# Patient Record
Sex: FEMALE | Race: OTHER | NOT HISPANIC OR LATINO | URBAN - METROPOLITAN AREA
[De-identification: names, ages, dates, MRNs, and addresses within clinical notes are randomized per-mention and may not be internally consistent; named-entity substitution may affect disease eponyms.]

---

## 2017-07-15 ENCOUNTER — OUTPATIENT (OUTPATIENT)
Dept: OUTPATIENT SERVICES | Facility: HOSPITAL | Age: 62
LOS: 1 days | Discharge: HOME | End: 2017-07-15

## 2017-07-15 DIAGNOSIS — R10.9 UNSPECIFIED ABDOMINAL PAIN: ICD-10-CM

## 2017-08-15 ENCOUNTER — OUTPATIENT (OUTPATIENT)
Dept: OUTPATIENT SERVICES | Facility: HOSPITAL | Age: 62
LOS: 1 days | Discharge: HOME | End: 2017-08-15

## 2017-08-21 DIAGNOSIS — R10.13 EPIGASTRIC PAIN: ICD-10-CM

## 2017-08-21 DIAGNOSIS — I10 ESSENTIAL (PRIMARY) HYPERTENSION: ICD-10-CM

## 2017-08-21 DIAGNOSIS — K29.60 OTHER GASTRITIS WITHOUT BLEEDING: ICD-10-CM

## 2017-10-20 ENCOUNTER — OUTPATIENT (OUTPATIENT)
Dept: OUTPATIENT SERVICES | Facility: HOSPITAL | Age: 62
LOS: 1 days | Discharge: HOME | End: 2017-10-20

## 2017-10-20 DIAGNOSIS — K21.9 GASTRO-ESOPHAGEAL REFLUX DISEASE WITHOUT ESOPHAGITIS: ICD-10-CM

## 2017-10-20 DIAGNOSIS — E78.00 PURE HYPERCHOLESTEROLEMIA, UNSPECIFIED: ICD-10-CM

## 2017-10-20 DIAGNOSIS — I10 ESSENTIAL (PRIMARY) HYPERTENSION: ICD-10-CM

## 2017-10-20 PROBLEM — Z00.00 ENCOUNTER FOR PREVENTIVE HEALTH EXAMINATION: Status: ACTIVE | Noted: 2017-10-20

## 2017-11-20 ENCOUNTER — APPOINTMENT (OUTPATIENT)
Dept: OBGYN | Facility: CLINIC | Age: 62
End: 2017-11-20

## 2017-11-20 ENCOUNTER — OUTPATIENT (OUTPATIENT)
Dept: OUTPATIENT SERVICES | Facility: HOSPITAL | Age: 62
LOS: 1 days | Discharge: HOME | End: 2017-11-20

## 2017-11-20 VITALS
BODY MASS INDEX: 30.67 KG/M2 | HEIGHT: 62 IN | WEIGHT: 166.7 LBS | DIASTOLIC BLOOD PRESSURE: 68 MMHG | SYSTOLIC BLOOD PRESSURE: 110 MMHG

## 2017-11-21 ENCOUNTER — RESULT REVIEW (OUTPATIENT)
Age: 62
End: 2017-11-21

## 2017-11-29 LAB — HUMAN PAPILLOMA VIRUS HR(SIUH): NOT DETECTED

## 2017-11-30 DIAGNOSIS — Z01.419 ENCOUNTER FOR GYNECOLOGICAL EXAMINATION (GENERAL) (ROUTINE) WITHOUT ABNORMAL FINDINGS: ICD-10-CM

## 2018-03-26 ENCOUNTER — TRANSCRIPTION ENCOUNTER (OUTPATIENT)
Age: 63
End: 2018-03-26

## 2018-03-27 ENCOUNTER — OUTPATIENT (OUTPATIENT)
Dept: OUTPATIENT SERVICES | Facility: HOSPITAL | Age: 63
LOS: 1 days | Discharge: HOME | End: 2018-03-27

## 2018-04-04 DIAGNOSIS — M79.89 OTHER SPECIFIED SOFT TISSUE DISORDERS: ICD-10-CM

## 2018-06-05 ENCOUNTER — TRANSCRIPTION ENCOUNTER (OUTPATIENT)
Age: 63
End: 2018-06-05

## 2019-02-14 ENCOUNTER — OUTPATIENT (OUTPATIENT)
Dept: OUTPATIENT SERVICES | Facility: HOSPITAL | Age: 64
LOS: 1 days | Discharge: HOME | End: 2019-02-14

## 2019-02-14 DIAGNOSIS — I10 ESSENTIAL (PRIMARY) HYPERTENSION: ICD-10-CM

## 2019-02-14 DIAGNOSIS — K21.9 GASTRO-ESOPHAGEAL REFLUX DISEASE WITHOUT ESOPHAGITIS: ICD-10-CM

## 2019-02-14 DIAGNOSIS — E78.00 PURE HYPERCHOLESTEROLEMIA, UNSPECIFIED: ICD-10-CM

## 2020-04-26 ENCOUNTER — MESSAGE (OUTPATIENT)
Age: 65
End: 2020-04-26

## 2020-05-21 ENCOUNTER — APPOINTMENT (OUTPATIENT)
Dept: DISASTER EMERGENCY | Facility: CLINIC | Age: 65
End: 2020-05-21

## 2020-05-22 LAB
SARS-COV-2 IGG SERPL IA-ACNC: <0.1 INDEX
SARS-COV-2 IGG SERPL QL IA: NEGATIVE

## 2020-11-03 ENCOUNTER — APPOINTMENT (OUTPATIENT)
Dept: GASTROENTEROLOGY | Facility: CLINIC | Age: 65
End: 2020-11-03
Payer: COMMERCIAL

## 2020-11-03 DIAGNOSIS — Z87.39 PERSONAL HISTORY OF OTHER DISEASES OF THE MUSCULOSKELETAL SYSTEM AND CONNECTIVE TISSUE: ICD-10-CM

## 2020-11-03 DIAGNOSIS — Z12.11 ENCOUNTER FOR SCREENING FOR MALIGNANT NEOPLASM OF COLON: ICD-10-CM

## 2020-11-03 DIAGNOSIS — Z78.9 OTHER SPECIFIED HEALTH STATUS: ICD-10-CM

## 2020-11-03 DIAGNOSIS — K21.9 GASTRO-ESOPHAGEAL REFLUX DISEASE W/OUT ESOPHAGITIS: ICD-10-CM

## 2020-11-03 DIAGNOSIS — Z83.3 FAMILY HISTORY OF DIABETES MELLITUS: ICD-10-CM

## 2020-11-03 DIAGNOSIS — R10.13 EPIGASTRIC PAIN: ICD-10-CM

## 2020-11-03 PROCEDURE — 99204 OFFICE O/P NEW MOD 45 MIN: CPT | Mod: 95

## 2020-11-03 RX ORDER — PANTOPRAZOLE 40 MG/1
40 TABLET, DELAYED RELEASE ORAL DAILY
Qty: 30 | Refills: 6 | Status: ACTIVE | COMMUNITY
Start: 2020-11-03 | End: 1900-01-01

## 2020-11-03 RX ORDER — PANTOPRAZOLE 40 MG/1
40 TABLET, DELAYED RELEASE ORAL
Refills: 0 | Status: ACTIVE | COMMUNITY

## 2020-11-03 RX ORDER — HYDROCHLOROTHIAZIDE 25 MG/1
25 TABLET ORAL
Refills: 0 | Status: ACTIVE | COMMUNITY

## 2020-11-03 RX ORDER — ATENOLOL 25 MG/1
25 TABLET ORAL
Refills: 0 | Status: ACTIVE | COMMUNITY

## 2020-11-03 NOTE — ASSESSMENT
[FreeTextEntry1] : 65 year old female patient average risk for CRC, due for screening colonoscopy in 2020, last EGD done  in 2017 for GERD and NUD was unremarkable, and currently is maintained on pantoprazole 40 daily, presents today for screening colonoscopy.\par Denies any GI complaints. \par \par Screening colonoscopy\par Risks and benefits discussed with patient.\par refilled pantoprazole\par

## 2020-11-03 NOTE — PHYSICAL EXAM
[General Appearance - Alert] : alert [Hearing Threshold Finger Rub Not Dodge] : hearing was normal [] : no respiratory distress [Oriented To Time, Place, And Person] : oriented to person, place, and time

## 2020-11-03 NOTE — HISTORY OF PRESENT ILLNESS
[Home] : at home, [unfilled] , at the time of the visit. [Verbal consent obtained from patient] : the patient, [unfilled] [Medical Office: ([facility])___] : at the medical office located in  [FreeTextEntry4] : Therese Glover [de-identified] : 65 year old female patient average risk for CRC, due for screening colonoscopy in 2020, last EGD done  in 2017 for GERD and NUD was unremarkable, and currently is maintained on pantoprazole 40 daily, presents today for screening colonoscopy.\par Denies any GI complaints.

## 2020-11-30 ENCOUNTER — APPOINTMENT (OUTPATIENT)
Dept: OBGYN | Facility: CLINIC | Age: 65
End: 2020-11-30
Payer: COMMERCIAL

## 2020-11-30 ENCOUNTER — OUTPATIENT (OUTPATIENT)
Dept: OUTPATIENT SERVICES | Facility: HOSPITAL | Age: 65
LOS: 1 days | Discharge: HOME | End: 2020-11-30

## 2020-11-30 ENCOUNTER — TRANSCRIPTION ENCOUNTER (OUTPATIENT)
Age: 65
End: 2020-11-30

## 2020-11-30 VITALS
SYSTOLIC BLOOD PRESSURE: 104 MMHG | WEIGHT: 147 LBS | DIASTOLIC BLOOD PRESSURE: 76 MMHG | HEIGHT: 62 IN | BODY MASS INDEX: 27.05 KG/M2

## 2020-11-30 PROCEDURE — 99396 PREV VISIT EST AGE 40-64: CPT

## 2020-11-30 NOTE — HISTORY OF PRESENT ILLNESS
[FreeTextEntry1] : P [Patient reported mammogram was normal] : Patient reported mammogram was normal [Patient reported PAP Smear was normal] : Patient reported PAP Smear was normal [TextBox_4] : Patient feels well. No complaints. No PMB. Here for annual [Mammogramdate] : 2019 [PapSmeardate] : 2017

## 2020-12-06 LAB — HPV HIGH+LOW RISK DNA PNL CVX: NOT DETECTED

## 2020-12-07 ENCOUNTER — LABORATORY RESULT (OUTPATIENT)
Age: 65
End: 2020-12-07

## 2020-12-08 ENCOUNTER — OUTPATIENT (OUTPATIENT)
Dept: OUTPATIENT SERVICES | Facility: HOSPITAL | Age: 65
LOS: 1 days | Discharge: HOME | End: 2020-12-08

## 2020-12-08 DIAGNOSIS — Z11.59 ENCOUNTER FOR SCREENING FOR OTHER VIRAL DISEASES: ICD-10-CM

## 2020-12-11 ENCOUNTER — RESULT REVIEW (OUTPATIENT)
Age: 65
End: 2020-12-11

## 2020-12-11 ENCOUNTER — OUTPATIENT (OUTPATIENT)
Dept: OUTPATIENT SERVICES | Facility: HOSPITAL | Age: 65
LOS: 1 days | Discharge: HOME | End: 2020-12-11
Payer: COMMERCIAL

## 2020-12-11 ENCOUNTER — TRANSCRIPTION ENCOUNTER (OUTPATIENT)
Age: 65
End: 2020-12-11

## 2020-12-11 VITALS
TEMPERATURE: 97 F | HEIGHT: 62 IN | HEART RATE: 54 BPM | RESPIRATION RATE: 17 BRPM | OXYGEN SATURATION: 99 % | WEIGHT: 147.05 LBS | SYSTOLIC BLOOD PRESSURE: 117 MMHG | DIASTOLIC BLOOD PRESSURE: 56 MMHG

## 2020-12-11 VITALS — SYSTOLIC BLOOD PRESSURE: 111 MMHG | HEART RATE: 62 BPM | RESPIRATION RATE: 15 BRPM | DIASTOLIC BLOOD PRESSURE: 75 MMHG

## 2020-12-11 DIAGNOSIS — Z98.890 OTHER SPECIFIED POSTPROCEDURAL STATES: Chronic | ICD-10-CM

## 2020-12-11 PROCEDURE — 45380 COLONOSCOPY AND BIOPSY: CPT

## 2020-12-11 PROCEDURE — 88305 TISSUE EXAM BY PATHOLOGIST: CPT | Mod: 26

## 2020-12-11 RX ORDER — PANTOPRAZOLE 40 MG/1
40 TABLET, DELAYED RELEASE ORAL DAILY
Qty: 30 | Refills: 6 | Status: ACTIVE | COMMUNITY
Start: 2020-12-11 | End: 1900-01-01

## 2020-12-11 NOTE — ASU DISCHARGE PLAN (ADULT/PEDIATRIC) - CARE PROVIDER_API CALL
Cira Adler (MD)  Gastroenterology  [address] [place] [address]  Phone: [phone]  Fax: [phone]  Follow Up Time:

## 2020-12-11 NOTE — PRE-ANESTHESIA EVALUATION ADULT - TUBE FEEDING HELD SINCE
PRIMARY DIAGNOSIS: PNEUMONIA  OUTPATIENT/OBSERVATION GOALS TO BE MET BEFORE DISCHARGE:  1. Dyspnea improved and O2 sats >88% on RA or back to baseline O2 levels: No   SpO2: 92 %, O2 Device: None (Room air)    Tolerating oral abx or appropriate plans made outpatient infusion: N/a    2. Vitals signs normal or return to baseline: Yes    3. Short term supplemental O2 needed with activity at home: No    4. Tolerate oral intake to maintain hydration: Yes    5. Return to near baseline physical activity: No    Discharge Planner Nurse   Safe discharge environment identified: Yes  Barriers to discharge: No       Entered by: Lionel Amaya 12/31/2019 10:09 AM     Please review provider order for any additional goals.   Nurse to notify provider when observation goals have been met and patient is ready for discharge.   11-Dec-2020 11:34

## 2020-12-11 NOTE — H&P PST ADULT - HISTORY OF PRESENT ILLNESS
64 yo female patient average risk CRC due to screening colonoscopy in 2020 64 yo female patient average risk CRC due to screening colonoscopy in 2020 last EGD 2017 for GERD and NUD was unremarkable currently maintained on pantoprazole 40mg daily plan for Colonoscopy today

## 2020-12-11 NOTE — ASU DISCHARGE PLAN (ADULT/PEDIATRIC) - PATIENT BELONGINGS
Patient's belongings returned Perilesional Excision Additional Text (Leave Blank If You Do Not Want): The margin was drawn around the clinically apparent lesion. Incisions were then made along these lines to the appropriate tissue plane and the lesion was extirpated.

## 2020-12-11 NOTE — ASU DISCHARGE PLAN (ADULT/PEDIATRIC) - CALL YOUR DOCTOR IF YOU HAVE ANY OF THE FOLLOWING:
Excessive diarrhea/Pain not relieved by Medications/Inability to tolerate liquids or foods/Fever greater than (need to indicate Fahrenheit or Celsius)/Nausea and vomiting that does not stop/Bleeding that does not stop

## 2020-12-14 LAB — SURGICAL PATHOLOGY STUDY: SIGNIFICANT CHANGE UP

## 2020-12-16 DIAGNOSIS — Z12.11 ENCOUNTER FOR SCREENING FOR MALIGNANT NEOPLASM OF COLON: ICD-10-CM

## 2020-12-16 DIAGNOSIS — D12.5 BENIGN NEOPLASM OF SIGMOID COLON: ICD-10-CM

## 2020-12-16 DIAGNOSIS — Z86.010 PERSONAL HISTORY OF COLONIC POLYPS: ICD-10-CM

## 2020-12-16 DIAGNOSIS — K21.9 GASTRO-ESOPHAGEAL REFLUX DISEASE WITHOUT ESOPHAGITIS: ICD-10-CM

## 2020-12-16 DIAGNOSIS — K64.8 OTHER HEMORRHOIDS: ICD-10-CM

## 2020-12-16 DIAGNOSIS — I10 ESSENTIAL (PRIMARY) HYPERTENSION: ICD-10-CM

## 2020-12-29 ENCOUNTER — OUTPATIENT (OUTPATIENT)
Dept: OUTPATIENT SERVICES | Facility: HOSPITAL | Age: 65
LOS: 1 days | Discharge: HOME | End: 2020-12-29
Payer: COMMERCIAL

## 2020-12-29 ENCOUNTER — RESULT REVIEW (OUTPATIENT)
Age: 65
End: 2020-12-29

## 2020-12-29 DIAGNOSIS — Z98.890 OTHER SPECIFIED POSTPROCEDURAL STATES: Chronic | ICD-10-CM

## 2020-12-29 DIAGNOSIS — Z12.31 ENCOUNTER FOR SCREENING MAMMOGRAM FOR MALIGNANT NEOPLASM OF BREAST: ICD-10-CM

## 2020-12-29 PROBLEM — I10 ESSENTIAL (PRIMARY) HYPERTENSION: Chronic | Status: ACTIVE | Noted: 2020-12-11

## 2020-12-29 PROCEDURE — 77067 SCR MAMMO BI INCL CAD: CPT | Mod: 26

## 2020-12-29 PROCEDURE — 77063 BREAST TOMOSYNTHESIS BI: CPT | Mod: 26

## 2021-01-14 ENCOUNTER — RESULT REVIEW (OUTPATIENT)
Age: 66
End: 2021-01-14

## 2021-01-14 ENCOUNTER — OUTPATIENT (OUTPATIENT)
Dept: OUTPATIENT SERVICES | Facility: HOSPITAL | Age: 66
LOS: 1 days | Discharge: HOME | End: 2021-01-14
Payer: COMMERCIAL

## 2021-01-14 DIAGNOSIS — Z98.890 OTHER SPECIFIED POSTPROCEDURAL STATES: Chronic | ICD-10-CM

## 2021-01-14 DIAGNOSIS — R92.8 OTHER ABNORMAL AND INCONCLUSIVE FINDINGS ON DIAGNOSTIC IMAGING OF BREAST: ICD-10-CM

## 2021-01-14 PROCEDURE — 76642 ULTRASOUND BREAST LIMITED: CPT | Mod: 26,RT

## 2021-01-14 PROCEDURE — 77065 DX MAMMO INCL CAD UNI: CPT | Mod: 26,RT

## 2021-01-14 PROCEDURE — G0279: CPT | Mod: 26

## 2021-01-15 ENCOUNTER — NON-APPOINTMENT (OUTPATIENT)
Age: 66
End: 2021-01-15

## 2021-01-20 ENCOUNTER — RESULT REVIEW (OUTPATIENT)
Age: 66
End: 2021-01-20

## 2021-01-20 ENCOUNTER — OUTPATIENT (OUTPATIENT)
Dept: OUTPATIENT SERVICES | Facility: HOSPITAL | Age: 66
LOS: 1 days | Discharge: HOME | End: 2021-01-20
Payer: COMMERCIAL

## 2021-01-20 DIAGNOSIS — Z98.890 OTHER SPECIFIED POSTPROCEDURAL STATES: Chronic | ICD-10-CM

## 2021-01-20 PROCEDURE — 19082 BX BREAST ADD LESION STRTCTC: CPT | Mod: RT

## 2021-01-20 PROCEDURE — 88305 TISSUE EXAM BY PATHOLOGIST: CPT | Mod: 26

## 2021-01-20 PROCEDURE — 88360 TUMOR IMMUNOHISTOCHEM/MANUAL: CPT | Mod: 26

## 2021-01-20 PROCEDURE — 19081 BX BREAST 1ST LESION STRTCTC: CPT | Mod: RT

## 2021-01-21 LAB — SURGICAL PATHOLOGY STUDY: SIGNIFICANT CHANGE UP

## 2021-01-22 ENCOUNTER — NON-APPOINTMENT (OUTPATIENT)
Age: 66
End: 2021-01-22

## 2021-01-25 ENCOUNTER — APPOINTMENT (OUTPATIENT)
Dept: SURGERY | Facility: CLINIC | Age: 66
End: 2021-01-25
Payer: COMMERCIAL

## 2021-01-25 PROCEDURE — 99205K: CUSTOM

## 2021-01-26 ENCOUNTER — APPOINTMENT (OUTPATIENT)
Dept: MRI IMAGING | Facility: IMAGING CENTER | Age: 66
End: 2021-01-26
Payer: COMMERCIAL

## 2021-01-26 ENCOUNTER — OUTPATIENT (OUTPATIENT)
Dept: OUTPATIENT SERVICES | Facility: HOSPITAL | Age: 66
LOS: 1 days | End: 2021-01-26
Payer: COMMERCIAL

## 2021-01-26 ENCOUNTER — RESULT REVIEW (OUTPATIENT)
Age: 66
End: 2021-01-26

## 2021-01-26 DIAGNOSIS — D05.11 INTRADUCTAL CARCINOMA IN SITU OF RIGHT BREAST: ICD-10-CM

## 2021-01-26 DIAGNOSIS — N64.1 FAT NECROSIS OF BREAST: ICD-10-CM

## 2021-01-26 DIAGNOSIS — Z00.8 ENCOUNTER FOR OTHER GENERAL EXAMINATION: ICD-10-CM

## 2021-01-26 DIAGNOSIS — N64.89 OTHER SPECIFIED DISORDERS OF BREAST: ICD-10-CM

## 2021-01-26 DIAGNOSIS — Z98.890 OTHER SPECIFIED POSTPROCEDURAL STATES: Chronic | ICD-10-CM

## 2021-01-26 DIAGNOSIS — Z17.0 ESTROGEN RECEPTOR POSITIVE STATUS [ER+]: ICD-10-CM

## 2021-01-26 DIAGNOSIS — R92.0 MAMMOGRAPHIC MICROCALCIFICATION FOUND ON DIAGNOSTIC IMAGING OF BREAST: ICD-10-CM

## 2021-01-26 DIAGNOSIS — N64.2 ATROPHY OF BREAST: ICD-10-CM

## 2021-01-26 PROCEDURE — C8937: CPT

## 2021-01-26 PROCEDURE — 77049 MRI BREAST C-+ W/CAD BI: CPT | Mod: 26

## 2021-01-26 PROCEDURE — C8908: CPT

## 2021-01-26 PROCEDURE — A9585: CPT

## 2021-01-29 ENCOUNTER — RESULT REVIEW (OUTPATIENT)
Age: 66
End: 2021-01-29

## 2021-02-01 NOTE — HISTORY OF PRESENT ILLNESS
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 \par \par Her work up was as follows: \par 12/29/2020 -- b/l screening mammogram \par -scattered areas of fibroglandular densities\par -R: calcifications in UOQ, R; asymmetry in medial R \par -no suspicious mass, microcalcifications or architectural distortion in L \par BIRADS 0 \par \par 1/14/2021 -- R dx mammogram and US \par -scattered areas of fibroglandular densities\par RIGHT: \par -UOQ, R, grouped heterogenous calcifications --> BIOPSY \par -indeterminate medial R asymmetry --> BIOPSY \par R US \par -@4N4, benign cyst, measures 3 x 3 x 2 mm \par BIRADS 4 \par \par 1/29/2021 -- R breast biopsies \par 1. R medial stereotactic guided biopsy \par -fatty breast tissue with fat necrosisi \par \par 2. R lateral calcifications \par -DCIS, intermediate to high nuclear grade \par -ER/DE pos ([facility] 8/5-6)\par \par 1/26/2021 -- MR breast \par RIGHT: \par -in UIQ, small hematoma, measures 2.1 x 1.3 x 3.6 cm with biopsy clip @ aite of biopsy proven benign fat necrosis \par -in UOQ, associate with biopsy proven DCIS, clumped progressive nonmass enhancement, measures 1.6 x 0.6 cm \par LEFT: \par -no suspicious enhancement in L \par BIRADS 6 \par \par

## 2021-02-01 NOTE — DATA REVIEWED
[FreeTextEntry1] : EXAM:  MG MAMMO SCREEN W AMOL BI#\par \par \par PROCEDURE DATE:  12/29/2020\par \par \par \par INTERPRETATION:  HISTORY:\par Bilateral MG MAMMO SCREEN W AMOL BI# was performed. Patient is 65 years old and is seen for screening. The patient has no personal history of cancer.  The patient has no family history of breast cancer.\par \par RISK ASSESSMENT:\par NCI Lifetime Risk: 5.7\par Tyrer-Cuzick Lifetime Risk: 3.8\par \par CLINICAL BREAST EXAM:\par The patient reports her last clinical breast exam was performed 1 month ago.\par \par COMPARISON STUDIES:\par The present examination has been compared to prior imaging studies performed at an outside location on 10/04/2016, 10/03/2017, 10/27/2018 and 10/12/2019.\par \par MAMMOGRAM FINDINGS:\par Mammography was performed including the following views: bilateral craniocaudal with tomosynthesis, bilateral mediolateral oblique, and bilateral mediolateral oblique with tomosynthesis.  The examination includes digital synthetic 2D and digital tomosynthesis 3D images. Additional imaging analysis was performed using CAD (computer-aided detection) software.\par \par There are scattered areas of fibroglandular density.\par \par Finding 1:  There are calcifications seen in the upper outer quadrant of the right breast.\par \par Finding 2:  There is an asymmetry seen in the medial of the right breast.\par \par No suspicious mass, grouping of calcifications, or other abnormality is identified in the left breast.\par \par IMPRESSION:\par Finding 1:  Calcifications in the right breast require additional evaluation.\par \par RECOMMENDATION:\par Patient will be recalled for additional views.\par \par Finding 2:  Asymmetry in the right breast requires additional evaluation.\par \par RECOMMENDATION:\par Patient will be recalled for additional mammographic views and, if indicated, breast ultrasound.\par \par ASSESSMENT:\par BI-RADS Category 0:  Incomplete: Needs Additional Imaging Evaluation\par \par The patient will be notified of these results by telephone, and will also be mailed a written summary in layman's terms.\par \par \par \par \par \par \par ALEXANDRA WHITE MD; Attending Radiologist\par This document has been electronically signed. Dec 30 2020  1:03PM\par \par EXAM:  MG US BREAST LIMITED RT\par EXAM:  MG MAMMO DIAG W AMOL RT#\par \par \par PROCEDURE DATE:  01/14/2021\par \par \par \par INTERPRETATION:  Clinical History / Reason for exam: Callback from screening mammogram\par \par The patient reports her last clinical breast examination was performed 1 month ago.\par \par Family history: None\par \par Comparisons: Priors dating back to 2016.\par \par Views obtained:Full-field ML and spot compression views tomosynthesis and magnification views of the right breast..\par \par Computer-aided detection was utilized in the interpretation of this examination.\par \par Breast composition:There are scattered areas of fibroglandular density.\par \par Findings:\par \par Mammogram:\par \par In the upper outer quadrant of the right breast, there are grouped heterogeneous calcifications which appears indeterminate. Indeterminate medial right breast asymmetry is again seen. Stereotactic guided biopsies are recommended.\par \par Ultrasound:\par \par Targeted unilateral right breast ultrasound was performed.\par \par At the 4:00 position 4 cm from nipple, incidentally noted, there is a benign cyst measuring 0.3 x 0.3 x 0.2 cm.\par \par Impression: Indeterminate right breast calcifications and asymmetries for which stereotactic guided biopsies are recommended.\par \par Recommendation: Stereotactic guided biopsy.\par \par BI-RADS Category 4: Suspicious\par \par \par The above findings and recommendations were discussed with the patient at the time of the examination.\par \par \par \par \par JERRY LEE DO; Attending Radiologist\par This document has been electronically signed. Jan 14 2021  4:15pm\par \par EXAM:  MG STEREO BX ADD RT SISC\par EXAM:  MG STEREO BX 1ST RT SISC\par \par *** ADDENDUM 01/22/2021  ***\par \par Postprocedure patient follow-up and Pathology result:\par \par -Diagnosis:\par 1.  BREAST, RIGHT ARCHITECTURAL DISTORTION-MEDIAL, STEREOTACTIC\par GUIDED VACUUM ASSISTED NEEDLE CORE BIOPSIES:\par -  BENIGN ATROPHIC FATTY BREAST TISSUE WITH NODULAR FAT NECROSIS\par AND PROLIFERATIVE TYPE FIBROCYSTIC CHANGES ASSOCIATED WITH\par MICROCALCIFICATIONS.\par \par 2.  BREAST, RIGHT CALCIFICATIONS-LATERAL, STEREOTACTIC GUIDED\par VACUUM ASSISTED NEEDLE CORE BIOPSIES:\par -  DUCTAL CARCINOMA IN-SITU (DCIS), SOLID AND COMEDO TYPES\par ASSOCIATED WITH CALCIFICATIONS, HIGH NUCLEAR GRADE.\par -  ATROPHIC FATTY BREAST TISSUE WITH PROLIFERATIVE TYPE\par FIBROCYSTIC CHANGES ASSOCIATED WITH MICROCALCIFICATIONS.\par -The pathology results are concordant with the imaging findings.\par \par -Recommendation: Surgical/oncological consultations recommended.\par \par -No significant delayed complications.\par \par -Results were discussed with patient on 1/22/2020 at 12:00 PM by Dr. Patino via telephone with read back.\par \par \par \par *** END OF ADDENDUM 01/22/2021  ***\par \par \par \par PROCEDURE DATE:  01/20/2021\par \par \par \par INTERPRETATION:  Clinical History / Reason for exam: Right breast calcification and asymmetry (architectural distortion)\par \par Procedures: right breast stereotactic vacuum assisted core biopsy and post clip placement two view right breast mammogram.\par \par Previous films and reports were reviewed. The procedure, its risks, benefits, and alternatives were explained to the patient, who expressed understanding and gave informed written and verbal consent. A time-out, which included the patient's full name, date of birth, description of the expected procedure and procedure site, was performed immediately before the procedure.\par \par Right breast asymmetry (architectural distortion) medial:\par A superior approach was selected for the procedure. Using the usual sterile technique and stereotactic guidance, the previously described asymmetry (architectural distortion) in the right breastmedial were biopsied using a 9 gauge vacuum-assisted device.  A single pass was made and 6 samples were collected.  Specimen radiography demonstrated the calcifications to be contained within the specimen.  A ([employer] mini-cork) localizing clip was then placed into the biopsy cavity. Hemostasis was obtained and a sterile dressing was applied.\par \par Right breast calcifications lateral:\par A superior approach was selected for the procedure. Using the usual sterile technique and stereotactic guidance, the previously described calcifications in the right breastlateral were biopsied using a 9 gauge vacuum-assisted device.  A single pass was made and 6 samples were collected.  Specimen radiography demonstrated the calcifications to be contained within the specimen.  A ([employer] top-hat) localizing clip was then placed into the biopsy cavity. Hemostasis was obtained and a sterile dressing was applied.\par \par A unilateral right  mammogram performed in the CC and lateral projections demonstrates biopsy marker in appropriate position.\par \par The patient tolerated the procedure well and left the department in good condition. Written discharge instructions were discussed and provided to the patient.\par \par IMPRESSION:\par \par Successful stereotactic guided biopsy of the right breast.\par \par \par Pathology: Pending, to be reported as an addendum.\par ***Please see the addendum at the top of this report. It may contain additional important information or changes.****\par \par \par \par ARLEN PATINO DO; Attending Radiologist\par This document has been electronically signed. Jan 21 2021  8:56AM\par Addend:ARLEN PATINO DO; Attending Radiologist\par This addendum was electronically signed on: Jan 22 2021  2:06PM\par \par \par EXAM:  MR BREAST WAWIC BI W CAD#\par \par \par PROCEDURE DATE:  01/26/2021\par \par \par \par INTERPRETATION:  CLINICAL INDICATION: 65 years old female with newly diagnosed right breast cancer presents for pretreatment breast MRI evaluation of extent of disease. The patient is status post stereotactic core biopsy of calcifications in the right upper outer breast on 1/20/2021 with pathology demonstrating ductal carcinoma in situ, high nuclear grade. Additionally, the patient is status post stereotactic core biopsy of an asymmetry with architectural distortion in the medial right breast on the same date with benign pathology of nodular fat necrosis and proliferative fibrocystic changes.\par \par TECHNIQUE:  MRI of both breasts was performed using a dedicated breast coil and 1.5 Faith closed MRI. Axial non fat-suppressed T1, followed by dynamic axial T1 fat-suppressed images during and following administration of gadolinium were obtained. Axial fat suppressed T2 images were also obtained.    Post processing including subtraction, 3-D reconstruction and kinetic enhancement analysis was performed on a dedicated [employer] workstation.\par \par CONTRAST/COMPLICATIONS:\par IV Contrast: Gadavist 7.5cc cc administered / 0cc cc discarded.\par Complications: None reported at time of study completion\par \par COMPARISON STUDIES: Baseline.\par CORRELATION STUDIES:Screening mammogram dated 12/29/2020, diagnostic mammogram and breast ultrasound dated 1/14/2021, stereotactic biopsy dated 1/20/2021\par \par FINDINGS:  The breast parenchyma is composed of scattered fibroglandular tissue.   The breasts demonstrate mild background parenchymal enhancement.\par \par RIGHT BREAST:\par In the upper inner right breast there is a small hematoma measuring approximately 2.1 x 1.3 x 3.1 cm associated with a biopsy clip at site of biopsy-proven benign fat necrosis. This hematoma tracks to the skin of the medial right breast. No associated suspicious enhancement is appreciated.\par \par In the upper outer right breast associated with a biopsy clip there is clumped progressive nonmass enhancement measuring 1.6 x 0.6 cm ([id number]) at site of biopsy-proven malignancy. A biopsy tract with associated minimal enhancement extends to the skin of the superior right breast.\par \par There are scattered enhancing nonspecific foci.  There is no additional suspicious enhancement in the right breast.\par \par LEFT BREAST:\par There are scattered enhancing nonspecific foci.  There is no suspicious enhancement in the left breast.\par \par AXILLA/OTHER:\par There is no significant axillary or internal mammary lymphadenopathy.\par \par IMPRESSION:\par 1.6 cm clumped nonmass enhancement in the upper outer right breast associated with a biopsy clip at site of biopsy-proven DCIS for which surgical management is recommended.\par \par Small hematoma associated with a biopsy clip in the upper inner right breast at site of biopsy-proven benign pathology. No associated suspicious enhancement at this biopsy site.\par \par No suspicious enhancing findings in the contralateral left breast.\par \par No lymphadenopathy.\par \par RECOMMENDATION:  Surgical or oncologic management.\par \par BI-RADS 6- Known Biopsy-Proven Malignancy\par The patient will  be mailed a written summary of these results in layman's terms.\par \par \par \par \par \par \par \par MARISOL AYALA MD; Attending Radiologist\par This document has been electronically signed. Jan 27 2021  7:50AM\par

## 2021-02-04 ENCOUNTER — APPOINTMENT (OUTPATIENT)
Dept: BREAST CENTER | Facility: CLINIC | Age: 66
End: 2021-02-04

## 2021-02-08 ENCOUNTER — APPOINTMENT (OUTPATIENT)
Dept: MAMMOGRAPHY | Facility: IMAGING CENTER | Age: 66
End: 2021-02-08
Payer: COMMERCIAL

## 2021-02-08 ENCOUNTER — OUTPATIENT (OUTPATIENT)
Dept: OUTPATIENT SERVICES | Facility: HOSPITAL | Age: 66
LOS: 1 days | End: 2021-02-08
Payer: COMMERCIAL

## 2021-02-08 ENCOUNTER — RESULT REVIEW (OUTPATIENT)
Age: 66
End: 2021-02-08

## 2021-02-08 VITALS
SYSTOLIC BLOOD PRESSURE: 126 MMHG | WEIGHT: 145.95 LBS | HEIGHT: 61 IN | TEMPERATURE: 97 F | RESPIRATION RATE: 16 BRPM | DIASTOLIC BLOOD PRESSURE: 70 MMHG | HEART RATE: 56 BPM | OXYGEN SATURATION: 98 %

## 2021-02-08 DIAGNOSIS — Z98.890 OTHER SPECIFIED POSTPROCEDURAL STATES: Chronic | ICD-10-CM

## 2021-02-08 DIAGNOSIS — Z01.812 ENCOUNTER FOR PREPROCEDURAL LABORATORY EXAMINATION: ICD-10-CM

## 2021-02-08 DIAGNOSIS — Z98.51 TUBAL LIGATION STATUS: Chronic | ICD-10-CM

## 2021-02-08 DIAGNOSIS — Z00.8 ENCOUNTER FOR OTHER GENERAL EXAMINATION: ICD-10-CM

## 2021-02-08 DIAGNOSIS — D05.11 INTRADUCTAL CARCINOMA IN SITU OF RIGHT BREAST: ICD-10-CM

## 2021-02-08 DIAGNOSIS — I10 ESSENTIAL (PRIMARY) HYPERTENSION: ICD-10-CM

## 2021-02-08 LAB
ALBUMIN SERPL ELPH-MCNC: 4.5 G/DL — SIGNIFICANT CHANGE UP (ref 3.3–5)
ALP SERPL-CCNC: 67 U/L — SIGNIFICANT CHANGE UP (ref 40–120)
ALT FLD-CCNC: 18 U/L — SIGNIFICANT CHANGE UP (ref 4–33)
ANION GAP SERPL CALC-SCNC: 12 MMOL/L — SIGNIFICANT CHANGE UP (ref 7–14)
AST SERPL-CCNC: 22 U/L — SIGNIFICANT CHANGE UP (ref 4–32)
BILIRUB SERPL-MCNC: 0.3 MG/DL — SIGNIFICANT CHANGE UP (ref 0.2–1.2)
BUN SERPL-MCNC: 22 MG/DL — SIGNIFICANT CHANGE UP (ref 7–23)
CALCIUM SERPL-MCNC: 9.8 MG/DL — SIGNIFICANT CHANGE UP (ref 8.4–10.5)
CHLORIDE SERPL-SCNC: 102 MMOL/L — SIGNIFICANT CHANGE UP (ref 98–107)
CO2 SERPL-SCNC: 28 MMOL/L — SIGNIFICANT CHANGE UP (ref 22–31)
CREAT SERPL-MCNC: 1.1 MG/DL — SIGNIFICANT CHANGE UP (ref 0.5–1.3)
GLUCOSE SERPL-MCNC: 89 MG/DL — SIGNIFICANT CHANGE UP (ref 70–99)
HCT VFR BLD CALC: 39.8 % — SIGNIFICANT CHANGE UP (ref 34.5–45)
HGB BLD-MCNC: 13 G/DL — SIGNIFICANT CHANGE UP (ref 11.5–15.5)
MCHC RBC-ENTMCNC: 29.3 PG — SIGNIFICANT CHANGE UP (ref 27–34)
MCHC RBC-ENTMCNC: 32.7 GM/DL — SIGNIFICANT CHANGE UP (ref 32–36)
MCV RBC AUTO: 89.8 FL — SIGNIFICANT CHANGE UP (ref 80–100)
NRBC # BLD: 0 /100 WBCS — SIGNIFICANT CHANGE UP
NRBC # FLD: 0 K/UL — SIGNIFICANT CHANGE UP
PLATELET # BLD AUTO: 231 K/UL — SIGNIFICANT CHANGE UP (ref 150–400)
POTASSIUM SERPL-MCNC: 3.2 MMOL/L — LOW (ref 3.5–5.3)
POTASSIUM SERPL-SCNC: 3.2 MMOL/L — LOW (ref 3.5–5.3)
PROT SERPL-MCNC: 7 G/DL — SIGNIFICANT CHANGE UP (ref 6–8.3)
RBC # BLD: 4.43 M/UL — SIGNIFICANT CHANGE UP (ref 3.8–5.2)
RBC # FLD: 13.1 % — SIGNIFICANT CHANGE UP (ref 10.3–14.5)
SODIUM SERPL-SCNC: 142 MMOL/L — SIGNIFICANT CHANGE UP (ref 135–145)
WBC # BLD: 6.44 K/UL — SIGNIFICANT CHANGE UP (ref 3.8–10.5)
WBC # FLD AUTO: 6.44 K/UL — SIGNIFICANT CHANGE UP (ref 3.8–10.5)

## 2021-02-08 PROCEDURE — 19281 PERQ DEVICE BREAST 1ST IMAG: CPT | Mod: RT

## 2021-02-08 PROCEDURE — C1739: CPT

## 2021-02-08 PROCEDURE — 19281 PERQ DEVICE BREAST 1ST IMAG: CPT

## 2021-02-08 PROCEDURE — 93010 ELECTROCARDIOGRAM REPORT: CPT

## 2021-02-08 RX ORDER — HYDROCHLOROTHIAZIDE 25 MG
0 TABLET ORAL
Qty: 0 | Refills: 0 | DISCHARGE

## 2021-02-08 RX ORDER — ASCORBIC ACID 60 MG
0 TABLET,CHEWABLE ORAL
Qty: 0 | Refills: 0 | DISCHARGE

## 2021-02-08 RX ORDER — ATENOLOL 25 MG/1
1 TABLET ORAL
Qty: 0 | Refills: 0 | DISCHARGE

## 2021-02-08 RX ORDER — SODIUM CHLORIDE 9 MG/ML
1000 INJECTION, SOLUTION INTRAVENOUS
Refills: 0 | Status: DISCONTINUED | OUTPATIENT
Start: 2021-02-16 | End: 2021-03-02

## 2021-02-08 NOTE — H&P PST ADULT - NSICDXPASTMEDICALHX_GEN_ALL_CORE_FT
PAST MEDICAL HISTORY:  GERD (gastroesophageal reflux disease)     HTN (hypertension)     Intraductal carcinoma in situ of right breast

## 2021-02-08 NOTE — H&P PST ADULT - NSICDXPROBLEM_GEN_ALL_CORE_FT
PROBLEM DIAGNOSES  Problem: Intraductal carcinoma in situ of right breast  Assessment and Plan: Patient scheduled for right partial mastectomy with seed localization on 2/16/2021  Written & verbal preop instructions, gi prophylaxis patient to take own & surgical soap given  Pt verbalized good understanding.  Teach back done on surgical soap instructions.     Problem: Hypertension  Assessment and Plan: Patient instructed to take Atenolol on AM of surgery with small sip of water     Problem: Encounter for preprocedure screening laboratory testing for COVID-19  Assessment and Plan: Patient aware of need for COVID testing prior to procedure and advised to co ordinate with surgeon.

## 2021-02-08 NOTE — H&P PST ADULT - ASSESSMENT
You can access the FollowMyHealth Patient Portal offered by [facility] by registering at the following website: [email or web address]. By joining [facility]’s FollowMyHealth portal, you will also be able to view your health information using other applications (apps) compatible with our system. 66 yo female with history of intraductal carcinoma in situ of right breast

## 2021-02-08 NOTE — H&P PST ADULT - HISTORY OF PRESENT ILLNESS
"i  64 yo female presents to [facility] for preop evaluation for right partial mastectomy with seed localization.  Patient reports having a routine mammogram and sonogram in December 2020 with subsequent biopsy, diagnosed with intraductal carcinoma in situ of right breast.  Patient denies any breast lumps of tenderness. 66 yo female presents to [facility] for preop evaluation for right partial mastectomy with seed localization.  Patient reports having a routine mammogram and sonogram in December 2020 with subsequent biopsy, diagnosed with intraductal carcinoma in situ of right breast.  Patient denies any breast lumps or tenderness.

## 2021-02-08 NOTE — H&P PST ADULT - NEGATIVE MUSCULOSKELETAL SYMPTOMS
no joint swelling/no myalgia/no muscle cramps/no muscle weakness/no stiffness/no neck pain/no back pain

## 2021-02-08 NOTE — H&P PST ADULT - NSICDXPASTSURGICALHX_GEN_ALL_CORE_FT
PAST SURGICAL HISTORY:   delivery delivered     H/O arthroscopy of right knee      PAST SURGICAL HISTORY:   delivery delivered     H/O arthroscopy of right knee     H/O right breast biopsy     H/O tubal ligation

## 2021-02-08 NOTE — H&P PST ADULT - NEGATIVE NEUROLOGICAL SYMPTOMS
no weakness/no paresthesias/no generalized seizures/no focal seizures/no syncope/no tremors/no vertigo/no loss of sensation/no difficulty walking/no headache/no loss of consciousness

## 2021-02-11 DIAGNOSIS — Z01.818 ENCOUNTER FOR OTHER PREPROCEDURAL EXAMINATION: ICD-10-CM

## 2021-02-12 PROBLEM — K21.9 GASTRO-ESOPHAGEAL REFLUX DISEASE WITHOUT ESOPHAGITIS: Chronic | Status: ACTIVE | Noted: 2021-02-08

## 2021-02-12 PROBLEM — D05.11 INTRADUCTAL CARCINOMA IN SITU OF RIGHT BREAST: Chronic | Status: ACTIVE | Noted: 2021-02-08

## 2021-02-13 ENCOUNTER — APPOINTMENT (OUTPATIENT)
Dept: DISASTER EMERGENCY | Facility: CLINIC | Age: 66
End: 2021-02-13

## 2021-02-14 LAB — SARS-COV-2 N GENE NPH QL NAA+PROBE: NOT DETECTED

## 2021-02-15 NOTE — ASU PATIENT PROFILE, ADULT - TEACHING/LEARNING LEARNING PREFERENCES
TDap given to patient.  L   Deltoid. Screening checklist reviewed with patient. VIS given    
verbal instruction/written material

## 2021-02-15 NOTE — ASU PATIENT PROFILE, ADULT - MENTAL HEALTH CONDITIONS/SYMPTOMS, PROFILE
Additional Notes: Focus on left lateral canthus. Not treated today for AK but was tx'd last visit. Site is clear today. Detail Level: Simple none

## 2021-02-15 NOTE — ASU PATIENT PROFILE, ADULT - PSH
delivery delivered    H/O arthroscopy of right knee    H/O right breast biopsy    H/O tubal ligation

## 2021-02-16 ENCOUNTER — APPOINTMENT (OUTPATIENT)
Dept: SURGERY | Facility: HOSPITAL | Age: 66
End: 2021-02-16

## 2021-02-16 ENCOUNTER — RESULT REVIEW (OUTPATIENT)
Age: 66
End: 2021-02-16

## 2021-02-16 ENCOUNTER — OUTPATIENT (OUTPATIENT)
Dept: OUTPATIENT SERVICES | Facility: HOSPITAL | Age: 66
LOS: 1 days | Discharge: HOME | End: 2021-02-16
Payer: COMMERCIAL

## 2021-02-16 VITALS
DIASTOLIC BLOOD PRESSURE: 68 MMHG | OXYGEN SATURATION: 98 % | HEIGHT: 61 IN | WEIGHT: 145.95 LBS | TEMPERATURE: 98 F | RESPIRATION RATE: 98 BRPM | HEART RATE: 52 BPM | SYSTOLIC BLOOD PRESSURE: 115 MMHG

## 2021-02-16 VITALS
SYSTOLIC BLOOD PRESSURE: 110 MMHG | HEART RATE: 58 BPM | DIASTOLIC BLOOD PRESSURE: 48 MMHG | OXYGEN SATURATION: 98 % | RESPIRATION RATE: 16 BRPM

## 2021-02-16 DIAGNOSIS — Z98.890 OTHER SPECIFIED POSTPROCEDURAL STATES: Chronic | ICD-10-CM

## 2021-02-16 DIAGNOSIS — Z98.51 TUBAL LIGATION STATUS: Chronic | ICD-10-CM

## 2021-02-16 DIAGNOSIS — D05.11 INTRADUCTAL CARCINOMA IN SITU OF RIGHT BREAST: ICD-10-CM

## 2021-02-16 PROCEDURE — 88341 IMHCHEM/IMCYTCHM EA ADD ANTB: CPT | Mod: 26

## 2021-02-16 PROCEDURE — 19301K: CUSTOM | Mod: RT

## 2021-02-16 PROCEDURE — 88342 IMHCHEM/IMCYTCHM 1ST ANTB: CPT | Mod: 26

## 2021-02-16 PROCEDURE — 76098 X-RAY EXAM SURGICAL SPECIMEN: CPT | Mod: 26

## 2021-02-16 PROCEDURE — 88305 TISSUE EXAM BY PATHOLOGIST: CPT | Mod: 26

## 2021-02-16 PROCEDURE — 88307 TISSUE EXAM BY PATHOLOGIST: CPT | Mod: 26

## 2021-02-16 RX ORDER — FENTANYL CITRATE 50 UG/ML
25 INJECTION INTRAVENOUS
Refills: 0 | Status: DISCONTINUED | OUTPATIENT
Start: 2021-02-16 | End: 2021-02-16

## 2021-02-16 RX ORDER — SODIUM CHLORIDE 9 MG/ML
1000 INJECTION, SOLUTION INTRAVENOUS
Refills: 0 | Status: DISCONTINUED | OUTPATIENT
Start: 2021-02-16 | End: 2021-03-02

## 2021-02-16 RX ORDER — ONDANSETRON 8 MG/1
4 TABLET, FILM COATED ORAL ONCE
Refills: 0 | Status: DISCONTINUED | OUTPATIENT
Start: 2021-02-16 | End: 2021-03-02

## 2021-02-16 NOTE — ASU DISCHARGE PLAN (ADULT/PEDIATRIC) - CARE PROVIDER_API CALL
Rose Jovel)  [facility]  [address], [address]  [place], [place] [id number]  Phone: [phone]  Fax: [phone]  Follow Up Time: 1 week

## 2021-02-22 ENCOUNTER — APPOINTMENT (OUTPATIENT)
Dept: SURGERY | Facility: CLINIC | Age: 66
End: 2021-02-22
Payer: COMMERCIAL

## 2021-02-22 LAB — SURGICAL PATHOLOGY STUDY: SIGNIFICANT CHANGE UP

## 2021-02-22 PROCEDURE — 99024 POSTOP FOLLOW-UP VISIT: CPT

## 2021-03-04 ENCOUNTER — APPOINTMENT (OUTPATIENT)
Dept: HEMATOLOGY ONCOLOGY | Facility: CLINIC | Age: 66
End: 2021-03-04
Payer: COMMERCIAL

## 2021-03-04 VITALS
DIASTOLIC BLOOD PRESSURE: 60 MMHG | WEIGHT: 150 LBS | BODY MASS INDEX: 28.32 KG/M2 | HEART RATE: 51 BPM | TEMPERATURE: 97.7 F | HEIGHT: 61 IN | SYSTOLIC BLOOD PRESSURE: 142 MMHG

## 2021-03-04 PROCEDURE — 99204 OFFICE O/P NEW MOD 45 MIN: CPT

## 2021-03-04 NOTE — ASSESSMENT
[FreeTextEntry1] : 66 yo female has intermediate nuclear grade ductal carcinoma in situ, ER/RI positive, s/p right breast lumpectomy with negative margins.\par \par Recommendations:\par - We had a detailed discussion regarding to her diagnosis, stage, prognosis and adjuvant endocrine therapy. The pathology report was reviewed. Heather has intermediate grade DCIS, ER positive, s/p lumpectomy with negative margins. She will be benefit from adjuvant endocrine therapy. We discussed the choice of adjuvant endocrine therapy including Tamoxifen and Anastrozole. 5-year Tamoxifen is the standard adjuvant endocrine therapy for hormone receptor positive DCIS. The potential side effects may include but not limit to a small increased risk for endodermal cancer, increased risk for cardiovascular events, thrombosis, vasomotor symptoms, depression and cataract. Anastrozole is another option. In the randomized clinical trial, Anastrozole demonstrated comparable efficacy to Tamoxifen but better side effect profile. The main side effects associated with Anastrozole are muscular and skeletal aching, arthralgia, loss of bone density, osteopenia and osteoporosis, a small risk of cardiovascular events and slightly increase of hyperlipidemia. \par - Discussed bone health management. Heather exercises regularly and she walks 5 miles a day. She has chronic knee pain due to bone on bone degenerative changes. She has never had bone density study. We will refer her for bone density. She will call me afterward and will decide which pill she will take. She is advised to continue calcium and vitamin D supplement.\par - She will see Dr. Lomeli to discuss adjuvant breast radiotherapy. She will start endocrine therapy after she completes radiation. \par \par All questions were answered. Heather agrees with the plan.\par \par RTO for followup in 3 months. She will call if there is any new concern. \par \par

## 2021-03-04 NOTE — PHYSICAL EXAM
[Fully active, able to carry on all pre-disease performance without restriction] : Status 0 - Fully active, able to carry on all pre-disease performance without restriction [Normal] : affect appropriate [de-identified] : Status post right breast lumpectomy. The surgical incision is healing well. There is no palpable abnormality.

## 2021-03-04 NOTE — CONSULT LETTER
[Dear  ___] : Dear  [unfilled], [Consult Letter:] : I had the pleasure of evaluating your patient, [unfilled]. [Please see my note below.] : Please see my note below. [Consult Closing:] : Thank you very much for allowing me to participate in the care of this patient.  If you have any questions, please do not hesitate to contact me. [Sincerely,] : Sincerely, [FreeTextEntry3] : Medina Curiel MD [DrDanielle  ___] : Dr. WOODS

## 2021-03-04 NOTE — HISTORY OF PRESENT ILLNESS
[de-identified] : 64 yo female is referred by Dr. Jovel for consultation of adjuvant endocrine therapy. She had a screening mammo on 2020 which showed calcifications in the upper outer quadrant of the right breast and an asymmetry seen in the medial of the right breast. On 2020, dx mammo and US right breast confirmed Indeterminate right breast calcifications and asymmetries for which stereotactic guided biopsies were recommended.\par \par On 2021, stereotactic guided vacuum assisted needle core biopsies were performed:\par -  The right breast architectural distortion-medial biopsy revealed benign atrophic fatty breast tissue with nodular fat necrosis and proliferative type fibrocystic changes associated with microcalcifications.\par - The right breast right calcifications -lateral biopsy showed Ductal carcinoma in-situ (DCIS), solid and comedo types associated with calcifications, high nuclear grade, ER pos 100%, OR pos 10%.\par \par On 2021, she had b/l breast MRI which showed 1.6 cm clumped nonmass enhancement in the upper outer right breast associated with a biopsy clip at site of biopsy-proven DCIS. Small hematoma associated with a biopsy clip in the upper inner right breast at site of biopsy-proven benign pathology. No associated suspicious enhancement at this biopsy site. No suspicious enhancing findings in the contralateral left breast. No lymphadenopathy.\par \par Heather saw Dr. Jovel for surgical consultation. She underwent right breast lumpectomy on 2021. The pathology report revealed single focus of ductal carcinoma in situ, intermediate nuclear grade, solid type. The margins were all negative for DCIS. Lobular carcinoma in situ, classic type-see was seen.  \par \par She recovered well from surgery and is here today to discuss adjuvant endocrine therapy.\par \par She is , menopause at early 50's. She does not have family h/o breast or ovarian cancer. \par \par

## 2021-03-08 ENCOUNTER — RESULT REVIEW (OUTPATIENT)
Age: 66
End: 2021-03-08

## 2021-03-08 ENCOUNTER — OUTPATIENT (OUTPATIENT)
Dept: OUTPATIENT SERVICES | Facility: HOSPITAL | Age: 66
LOS: 1 days | Discharge: HOME | End: 2021-03-08

## 2021-03-08 DIAGNOSIS — Z98.890 OTHER SPECIFIED POSTPROCEDURAL STATES: Chronic | ICD-10-CM

## 2021-03-08 DIAGNOSIS — Z98.51 TUBAL LIGATION STATUS: Chronic | ICD-10-CM

## 2021-03-09 DIAGNOSIS — Z13.820 ENCOUNTER FOR SCREENING FOR OSTEOPOROSIS: ICD-10-CM

## 2021-03-09 DIAGNOSIS — N95.9 UNSPECIFIED MENOPAUSAL AND PERIMENOPAUSAL DISORDER: ICD-10-CM

## 2021-03-12 ENCOUNTER — APPOINTMENT (OUTPATIENT)
Dept: RADIATION ONCOLOGY | Facility: HOSPITAL | Age: 66
End: 2021-03-12
Payer: COMMERCIAL

## 2021-03-12 VITALS
HEIGHT: 61 IN | SYSTOLIC BLOOD PRESSURE: 121 MMHG | DIASTOLIC BLOOD PRESSURE: 78 MMHG | RESPIRATION RATE: 12 BRPM | TEMPERATURE: 97.2 F | HEART RATE: 67 BPM | WEIGHT: 148 LBS | BODY MASS INDEX: 27.94 KG/M2 | OXYGEN SATURATION: 100 %

## 2021-03-12 DIAGNOSIS — Z78.9 OTHER SPECIFIED HEALTH STATUS: ICD-10-CM

## 2021-03-12 PROCEDURE — 99204 OFFICE O/P NEW MOD 45 MIN: CPT

## 2021-03-12 RX ORDER — DIAZEPAM 5 MG/1
5 TABLET ORAL
Qty: 4 | Refills: 0 | Status: DISCONTINUED | COMMUNITY
Start: 2021-01-25 | End: 2021-03-12

## 2021-03-12 RX ORDER — SODIUM SULFATE, POTASSIUM SULFATE, MAGNESIUM SULFATE 17.5; 3.13; 1.6 G/ML; G/ML; G/ML
17.5-3.13-1.6 SOLUTION, CONCENTRATE ORAL
Qty: 1 | Refills: 0 | Status: DISCONTINUED | COMMUNITY
Start: 2020-11-03 | End: 2021-03-12

## 2021-03-12 RX ORDER — DICLOFENAC SODIUM 75 MG/1
75 TABLET, DELAYED RELEASE ORAL
Refills: 0 | Status: DISCONTINUED | COMMUNITY
End: 2021-03-12

## 2021-03-12 NOTE — PHYSICAL EXAM
[Sclera] : the sclera and conjunctiva were normal [Hearing Threshold Finger Rub Not [place]] : hearing was normal [No Focal Deficits] : no focal deficits [Normal] : oriented to person, place and time, the affect was normal, the mood was normal and not anxious [Abdomen Soft] : soft [Abdomen Tenderness] : non-tender [Cervical Lymph Nodes Enlarged Posterior Bilaterally] : posterior cervical [Cervical Lymph Nodes Enlarged Anterior Bilaterally] : anterior cervical [Supraclavicular Lymph Nodes Enlarged Bilaterally] : supraclavicular [Axillary Lymph Nodes Enlarged Bilaterally] : axillary [de-identified] : She is examined in both the upright and supine positions.  The right breast is s/p lumpectomy - mild seroma , incision healing well - no erythema .   The left breast is unremarkable.  No palpable mass in either breast.

## 2021-03-12 NOTE — VITALS
[Maximal Pain Intensity: 0/10] : 0/10 [100: Normal, no complaints, no evidence of disease.] : 100: Normal, no complaints, no evidence of disease. [Date: ____________] : Patient's last distress assessment performed on [unfilled]. [0 - No Distress] : Distress Level: 0

## 2021-03-12 NOTE — HISTORY OF PRESENT ILLNESS
[FreeTextEntry1] : \par The patient is a 65 year  old woman who was recently noted on screening mammogram (12/29/2020) to have asymmetry in the right breast, in the medial region.  Diagnostic mammogram and ultrasound on 1/14/2021 showed in the upper outer quadrant of the right breast, there are grouped heterogeneous calcifications which appears indeterminate. In addition,  at the 4 o'clock, 4 cm FN, incidentally noted, there is a benign cyst measuring 0.3 X 0.3 X 0.2 cm.  Recommendation: Stereotactic guided biopsy. BI-RADS 4: Suspicious.\par \par On 1/20/2021, she had a biopsy of the right breast and pathology showed at the medial breast, benign atrophic fatty breast tissue with nodular fat necrosis and proliferative type fibrocystic changes associated with microcalcifications.  At the right lateral calcifications, ductal carcinoma in-situ, solid and comedo types associated with calcification, high nuclear grade.   It was ER/[place] positive. \par \par She also had an MRI of bilateral breasts on 1/26/2021, which showed 1.6 cm clumped non-mass enhancement in the upper outer right breast associated with a biopsy clip at site of biopsy-proven DCIS for which surgical management is recommended.\par Small hematoma associated with a biopsy clip in the upper inner right breast at site of biopsy-proven benign pathology. No associated suspicious enhancement at this biopsy site.  No suspicious enhancing findings in the contralateral left breast.\par No lymphadenopathy.\par RECOMMENDATION:  Surgical or oncologic management.\par BI-RADS 6- Known Biopsy-Proven Malignancy\par \par On 2/16/2021, She underwent a lumpectomy.  She was found to have ductal carcinoma in-situ in one focus, 1 mm, G2, with LCIS, ER/[place] positive.  Surgical margins were negative.\par \par She has been doing well since surgery.  Has  slight breast discomfort that is getting better.   She runs or walks 5 miles, 7 days/week since the pandemic.  She is here to discuss radiation. \par \par Med/Onc: Dr. Curiel - discussed adjuvant endocrine therapy.  Dexa scan completed on 3/8/2021. \par \par \par

## 2021-03-12 NOTE — DISEASE MANAGEMENT
[Pathological] : TNM Stage: p [0] : 0 [FreeTextEntry4] : ductal carcinoma insitu, right breast, G2, ER/[place] positive, upper outer [TTNM] : is [NTNM] : 0 [MTNM] : 0 [de-identified] : right breast

## 2021-03-12 NOTE — LETTER CLOSING
[Consult Closing:] : Thank you for allowing me to participate in the care of this patient.  If you have any questions, please do not hesitate to contact me. [Sincerely yours,] : Sincerely yours, [FreeTextEntry3] : Heide Lomeli M.D. \par \par Electronically proofread and signed by:  Heide Lomeli MD\par Attending, Department of Radiation Medicine\par [facility]\par \par CC: Dr. Jovel

## 2021-03-17 PROCEDURE — 77332 RADIATION TREATMENT AID(S): CPT | Mod: 26

## 2021-03-17 PROCEDURE — 77290 THER RAD SIMULAJ FIELD CPLX: CPT | Mod: 26

## 2021-03-19 PROCEDURE — 77263 THER RADIOLOGY TX PLNG CPLX: CPT

## 2021-03-25 PROCEDURE — 77295 3-D RADIOTHERAPY PLAN: CPT | Mod: 26

## 2021-03-25 PROCEDURE — 77300 RADIATION THERAPY DOSE PLAN: CPT | Mod: 26

## 2021-03-25 PROCEDURE — 77334 RADIATION TREATMENT AID(S): CPT | Mod: 26

## 2021-03-30 PROCEDURE — 77280 THER RAD SIMULAJ FIELD SMPL: CPT | Mod: 26

## 2021-04-05 ENCOUNTER — NON-APPOINTMENT (OUTPATIENT)
Age: 66
End: 2021-04-05

## 2021-04-05 VITALS
SYSTOLIC BLOOD PRESSURE: 138 MMHG | BODY MASS INDEX: 27.96 KG/M2 | TEMPERATURE: 97.7 F | WEIGHT: 148 LBS | HEART RATE: 50 BPM | RESPIRATION RATE: 12 BRPM | DIASTOLIC BLOOD PRESSURE: 63 MMHG

## 2021-04-05 DIAGNOSIS — R53.83 OTHER FATIGUE: ICD-10-CM

## 2021-04-05 NOTE — VITALS
[Maximal Pain Intensity: 3/10] : 3/10 [Least Pain Intensity: 0/10] : 0/10 [NoTreatment Scheduled] : no treatment scheduled [90: Able to carry normal activity; minor signs or symptoms of disease.] : 90: Able to carry normal activity; minor signs or symptoms of disease.

## 2021-04-05 NOTE — PHYSICAL EXAM
[Sclera] : the sclera and conjunctiva were normal [Hearing Threshold Finger Rub Not [place]] : hearing was normal [No Focal Deficits] : no focal deficits [Normal] : oriented to person, place and time, the affect was normal, the mood was normal and not anxious [de-identified] : no erythema - right breast

## 2021-04-05 NOTE — HISTORY OF PRESENT ILLNESS
[FreeTextEntry1] : Patient reports feeling tired, she is working full time while on treatment.  She didn't go walking in the last three days due to her low energy level. She has mod right breast discomfort after her XRT but then it goes away.   She is eating well and offers no other concerns.

## 2021-04-05 NOTE — REVIEW OF SYSTEMS
[Nausea: Grade 0] : Nausea: Grade 0 [Vomiting: Grade 0] : Vomiting: Grade 0 [Fatigue: Grade 2 - Fatigue not relieved by rest; limiting instrumental ADL] : Fatigue: Grade 2 - Fatigue not relieved by rest; limiting instrumental ADL [Skin Hyperpigmentation: Grade 0] : Skin Hyperpigmentation: Grade 0 [Dermatitis Radiation: Grade 0] : Dermatitis Radiation: Grade 0

## 2021-04-05 NOTE — DISEASE MANAGEMENT
[Pathological] : TNM Stage: p [0] : 0 [FreeTextEntry4] : ductal carcinoma in-situ, right breast, G2, ER/[place] positive, upper outer [TTNM] : is [NTNM] : 0 [de-identified] : [id number] [MTNM] : 0 [[place] [id number]] : [id number] [de-identified] : right breast

## 2021-04-06 ENCOUNTER — NON-APPOINTMENT (OUTPATIENT)
Age: 66
End: 2021-04-06

## 2021-04-12 ENCOUNTER — NON-APPOINTMENT (OUTPATIENT)
Age: 66
End: 2021-04-12

## 2021-04-12 VITALS
RESPIRATION RATE: 14 BRPM | SYSTOLIC BLOOD PRESSURE: 120 MMHG | HEART RATE: 57 BPM | TEMPERATURE: 97.9 F | DIASTOLIC BLOOD PRESSURE: 59 MMHG | WEIGHT: 148 LBS | BODY MASS INDEX: 27.96 KG/M2

## 2021-04-12 NOTE — PHYSICAL EXAM
[] : no respiratory distress [Normal] : oriented to person, place and time, the affect was normal, the mood was normal and not anxious [de-identified] : Mild erythema, no desquamation

## 2021-04-12 NOTE — HISTORY OF PRESENT ILLNESS
[FreeTextEntry1] : Nursing OTV: pt denies any pain. denies any N/V/D. c/o fatigue, especially today. skin care discussed. pt using Aquaphor daily. \par MD Note:  She is doing well.  Mild breast discomfort and increased fatigue.  No new concerns\par

## 2021-04-12 NOTE — REVIEW OF SYSTEMS
[Fatigue: Grade 2 - Fatigue not relieved by rest; limiting instrumental ADL] : Fatigue: Grade 2 - Fatigue not relieved by rest; limiting instrumental ADL [Dermatitis Radiation: Grade 1 - Faint erythema or dry desquamation] : Dermatitis Radiation: Grade 1 - Faint erythema or dry desquamation

## 2021-04-12 NOTE — DISEASE MANAGEMENT
[Pathological] : TNM Stage: p [0] : 0 [FreeTextEntry4] : ductal carcinoma in-situ, right breast, G2, ER/[place] positive, upper outer [TTNM] : is [NTNM] : 0 [MTNM] : 0 [de-identified] : [id number] [de-identified] : [id number] [de-identified] : right breast

## 2021-04-16 ENCOUNTER — NON-APPOINTMENT (OUTPATIENT)
Age: 66
End: 2021-04-16

## 2021-04-19 ENCOUNTER — NON-APPOINTMENT (OUTPATIENT)
Age: 66
End: 2021-04-19

## 2021-04-19 VITALS
RESPIRATION RATE: 12 BRPM | DIASTOLIC BLOOD PRESSURE: 59 MMHG | HEART RATE: 57 BPM | OXYGEN SATURATION: 100 % | WEIGHT: 145.25 LBS | BODY MASS INDEX: 27.44 KG/M2 | SYSTOLIC BLOOD PRESSURE: 125 MMHG | TEMPERATURE: 97.7 F

## 2021-04-21 ENCOUNTER — OUTPATIENT (OUTPATIENT)
Dept: OUTPATIENT SERVICES | Facility: HOSPITAL | Age: 66
LOS: 1 days | Discharge: HOME | End: 2021-04-21
Payer: COMMERCIAL

## 2021-04-21 DIAGNOSIS — D05.11 INTRADUCTAL CARCINOMA IN SITU OF RIGHT BREAST: ICD-10-CM

## 2021-04-21 DIAGNOSIS — Z98.890 OTHER SPECIFIED POSTPROCEDURAL STATES: Chronic | ICD-10-CM

## 2021-04-21 DIAGNOSIS — Z98.51 TUBAL LIGATION STATUS: Chronic | ICD-10-CM

## 2021-04-21 NOTE — HISTORY OF PRESENT ILLNESS
[FreeTextEntry1] : Patient reports of some fatigue but continues with her ADLs. She is applying moisturizer as instructed.  \par \par MD Note:  She is doing well.  Mild breast discomfort.  No new concerns\par

## 2021-04-21 NOTE — DISEASE MANAGEMENT
[FreeTextEntry4] : ductal carcinoma in-situ, right breast, G2, ER/FL positive, upper outer [TTNM] : is [NTNM] : 0 [MTNM] : 0 [de-identified] : [id number] [de-identified] : [id number] [de-identified] : right breast

## 2021-05-27 ENCOUNTER — APPOINTMENT (OUTPATIENT)
Dept: RADIATION ONCOLOGY | Facility: HOSPITAL | Age: 66
End: 2021-05-27
Payer: COMMERCIAL

## 2021-05-27 VITALS
OXYGEN SATURATION: 99 % | HEART RATE: 64 BPM | SYSTOLIC BLOOD PRESSURE: 125 MMHG | WEIGHT: 149 LBS | BODY MASS INDEX: 28.15 KG/M2 | DIASTOLIC BLOOD PRESSURE: 59 MMHG | RESPIRATION RATE: 12 BRPM | TEMPERATURE: 98.8 F

## 2021-05-27 DIAGNOSIS — Z92.3 PERSONAL HISTORY OF IRRADIATION: ICD-10-CM

## 2021-05-27 PROCEDURE — 99024 POSTOP FOLLOW-UP VISIT: CPT

## 2021-05-27 NOTE — DISEASE MANAGEMENT
[Pathological] : TNM Stage: p [0] : 0 [FreeTextEntry4] : ductal carcinoma in-situ, right breast, G2, ER/[place] positive, upper outer [TTNM] : is [NTNM] : 0 [MTNM] : 0 [de-identified] : right breast

## 2021-05-27 NOTE — HISTORY OF PRESENT ILLNESS
[FreeTextEntry1] : \par ELEN ALSTON returns to clinic in follow up visit.  As you know, ELEN ALSTON is a 65 year old female with ductal carcinoma in-situ, right breast, G2, ER/[place] positive, [id number], Stage 0. She is s/p breast conserving surgery.  The patient received 4240 cGy to the right breast from 3/31/2021 through 4/21/2021 without complications.  I last saw her in April.    In the interim, the patient reports doing well.  Her skin has healed well.  She stared her Anastrozole on 5/1/2021 and is tolerating it well.  She is active, working, eating well.  Otherwise, she has no new complaints/concerns.  \par \par Upcoming appointments: \jovanna Curiel 6/10/2021 @ 2 pm \jovanna Jovel \par Dr. Jose Lawrence \par \par

## 2021-05-27 NOTE — LETTER CLOSING
[Consult Closing:] : Thank you for allowing me to participate in the care of this patient.  If you have any questions, please do not hesitate to contact me. [Sincerely yours,] : Sincerely yours, [FreeTextEntry3] : Heide Lomeli M.D. \par \par Electronically proofread and signed by:  Heide Lomeli MD\par Attending, Department of Radiation Medicine\par [facility]\par \par CC: Dr. Jovel

## 2021-05-27 NOTE — PHYSICAL EXAM
[Sclera] : the sclera and conjunctiva were normal [Heart Rate And Rhythm] : heart rate and rhythm were normal [Heart Sounds] : normal S1 and S2 [Murmurs] : no murmurs present [Edema] : no peripheral edema present [Breast Palpation Mass] : no palpable masses [Breast Abnormal Lactation (Galactorrhea)] : no nipple discharge [Cervical Lymph Nodes Enlarged Posterior Bilaterally] : posterior cervical [Cervical Lymph Nodes Enlarged Anterior Bilaterally] : anterior cervical [Supraclavicular Lymph Nodes Enlarged Bilaterally] : supraclavicular [Lt > Rt] : the left breast was larger than the right [No Discharge] : no discharge [Axillary Lymph Nodes Enlarged Bilaterally] : no enlarged nodes [Normal] : no palpable adenopathy [de-identified] : She is examined in both the upright and supine positions.  The right breast has some residual hyperpigmentation and some soft tissue fibrosis in the surgical bed.   The left breast is unremarkable.  No palpable mass in either breast.

## 2021-06-10 ENCOUNTER — OUTPATIENT (OUTPATIENT)
Dept: OUTPATIENT SERVICES | Facility: HOSPITAL | Age: 66
LOS: 1 days | Discharge: HOME | End: 2021-06-10

## 2021-06-10 ENCOUNTER — APPOINTMENT (OUTPATIENT)
Dept: HEMATOLOGY ONCOLOGY | Facility: CLINIC | Age: 66
End: 2021-06-10
Payer: COMMERCIAL

## 2021-06-10 VITALS
BODY MASS INDEX: 27.75 KG/M2 | HEIGHT: 61 IN | TEMPERATURE: 98 F | HEART RATE: 54 BPM | SYSTOLIC BLOOD PRESSURE: 102 MMHG | WEIGHT: 147 LBS | DIASTOLIC BLOOD PRESSURE: 59 MMHG

## 2021-06-10 DIAGNOSIS — Z98.51 TUBAL LIGATION STATUS: Chronic | ICD-10-CM

## 2021-06-10 DIAGNOSIS — Z98.890 OTHER SPECIFIED POSTPROCEDURAL STATES: Chronic | ICD-10-CM

## 2021-06-10 DIAGNOSIS — D05.11 INTRADUCTAL CARCINOMA IN SITU OF RIGHT BREAST: ICD-10-CM

## 2021-06-10 PROCEDURE — 99214 OFFICE O/P EST MOD 30 MIN: CPT

## 2021-06-20 NOTE — ASSESSMENT
[FreeTextEntry1] : 64 yo female has intermediate nuclear grade ductal carcinoma in situ, ER/MT positive, s/p right breast lumpectomy with negative margins.\par \par Recommendations:\par -- Continue Anastrozole daily.\par -- Breast exam today did not reveal palpable abnormality. She will have repeat dx mammogram and US right  breast in 8/2021.\par -- Discussed bone health management. Bone density done 3/8/21 was normal. Continue calcium and vitamin D supplement and regular exercise. \par -- Followup with Dr. Jovel and Dr. Lomeli as scheduled. \par -- Follow up with PCP for other healthcare issues.\par -- RTO for followup in 3 months. She will call if there is any new concern. \par \par Case was seen and discussed with Dr. Curiel who agreed with the assessment and plan.\par \par \par

## 2021-06-20 NOTE — HISTORY OF PRESENT ILLNESS
[de-identified] : 64 yo female is referred by Dr. Jovel for consultation of adjuvant endocrine therapy. She had a screening mammo on 2020 which showed calcifications in the upper outer quadrant of the right breast and an asymmetry seen in the medial of the right breast. On 2020, dx mammo and US right breast confirmed Indeterminate right breast calcifications and asymmetries for which stereotactic guided biopsies were recommended.\par \par On 2021, stereotactic guided vacuum assisted needle core biopsies were performed:\par -  The right breast architectural distortion-medial biopsy revealed benign atrophic fatty breast tissue with nodular fat necrosis and proliferative type fibrocystic changes associated with microcalcifications.\par - The right breast right calcifications -lateral biopsy showed Ductal carcinoma in-situ (DCIS), solid and comedo types associated with calcifications, high nuclear grade, ER pos 100%, [place] pos 10%.\par \par On 2021, she had b/l breast MRI which showed 1.6 cm clumped nonmass enhancement in the upper outer right breast associated with a biopsy clip at site of biopsy-proven DCIS. Small hematoma associated with a biopsy clip in the upper inner right breast at site of biopsy-proven benign pathology. No associated suspicious enhancement at this biopsy site. No suspicious enhancing findings in the contralateral left breast. No lymphadenopathy.\par \par Heather saw Dr. Jovel for surgical consultation. She underwent right breast lumpectomy on 2021. The pathology report revealed single focus of ductal carcinoma in situ, intermediate nuclear grade, solid type. The margins were all negative for DCIS. Lobular carcinoma in situ, classic type-see was seen.  \par \par She recovered well from surgery and is here today to discuss adjuvant endocrine therapy.\par \par She is , menopause at early 50's. She does not have family h/o breast or ovarian cancer. \par \par  [de-identified] : \par 6/10/21\par Pt is here today for follow up visit. She has  DCIS  s/p right breast lumpectomy with negative margins on 2/16/21.  She completed adjuvant WBI in April 2021 and started Anastrozole on 5/1/21 which she is tolerating well. She walks 5 miles 5x/week.  Last bone density was done 3/8/21- normal.  She takes calcium and vitamin D.  She is scheduled for her mammogram on 8/2021.  She offers no new breast complaints.

## 2021-06-20 NOTE — PHYSICAL EXAM
[Fully active, able to carry on all pre-disease performance without restriction] : Status 0 - Fully active, able to carry on all pre-disease performance without restriction [Normal] : affect appropriate [de-identified] : Status post right breast lumpectomy. The surgical incision is healing well. There is no palpable abnormality.

## 2021-06-20 NOTE — CONSULT LETTER
[Dear  ___] : Dear  [unfilled], [Please see my note below.] : Please see my note below. [Sincerely,] : Sincerely, [DrDanielle  ___] : Dr. WOODS [Courtesy Letter:] : I had the pleasure of seeing your patient, [unfilled], in my office today. [FreeTextEntry3] : Medina Curiel MD

## 2021-06-22 DIAGNOSIS — Z51.81 ENCOUNTER FOR THERAPEUTIC DRUG LEVEL MONITORING: ICD-10-CM

## 2021-07-21 ENCOUNTER — RESULT REVIEW (OUTPATIENT)
Age: 66
End: 2021-07-21

## 2021-07-21 ENCOUNTER — OUTPATIENT (OUTPATIENT)
Dept: OUTPATIENT SERVICES | Facility: HOSPITAL | Age: 66
LOS: 1 days | Discharge: HOME | End: 2021-07-21
Payer: COMMERCIAL

## 2021-07-21 DIAGNOSIS — Z98.890 OTHER SPECIFIED POSTPROCEDURAL STATES: Chronic | ICD-10-CM

## 2021-07-21 DIAGNOSIS — Z98.51 TUBAL LIGATION STATUS: Chronic | ICD-10-CM

## 2021-07-21 DIAGNOSIS — R92.8 OTHER ABNORMAL AND INCONCLUSIVE FINDINGS ON DIAGNOSTIC IMAGING OF BREAST: ICD-10-CM

## 2021-07-21 PROCEDURE — G0279: CPT | Mod: 26

## 2021-07-21 PROCEDURE — 76642 ULTRASOUND BREAST LIMITED: CPT | Mod: 26,RT

## 2021-07-21 PROCEDURE — 77065 DX MAMMO INCL CAD UNI: CPT | Mod: 26,RT

## 2021-08-11 ENCOUNTER — APPOINTMENT (OUTPATIENT)
Dept: SURGERY | Facility: CLINIC | Age: 66
End: 2021-08-11
Payer: COMMERCIAL

## 2021-08-11 PROCEDURE — 99213K: CUSTOM

## 2021-09-27 ENCOUNTER — APPOINTMENT (OUTPATIENT)
Dept: HEMATOLOGY ONCOLOGY | Facility: CLINIC | Age: 66
End: 2021-09-27
Payer: COMMERCIAL

## 2021-09-27 ENCOUNTER — OUTPATIENT (OUTPATIENT)
Dept: OUTPATIENT SERVICES | Facility: HOSPITAL | Age: 66
LOS: 1 days | Discharge: HOME | End: 2021-09-27

## 2021-09-27 VITALS
WEIGHT: 145 LBS | DIASTOLIC BLOOD PRESSURE: 65 MMHG | SYSTOLIC BLOOD PRESSURE: 121 MMHG | HEIGHT: 61 IN | BODY MASS INDEX: 27.38 KG/M2 | TEMPERATURE: 97.4 F | HEART RATE: 58 BPM

## 2021-09-27 DIAGNOSIS — Z98.890 OTHER SPECIFIED POSTPROCEDURAL STATES: Chronic | ICD-10-CM

## 2021-09-27 DIAGNOSIS — Z98.51 TUBAL LIGATION STATUS: Chronic | ICD-10-CM

## 2021-09-27 PROCEDURE — 99214 OFFICE O/P EST MOD 30 MIN: CPT

## 2021-09-28 NOTE — CONSULT LETTER
[Dear  ___] : Dear  [unfilled], [Courtesy Letter:] : I had the pleasure of seeing your patient, [unfilled], in my office today. [Please see my note below.] : Please see my note below. [Sincerely,] : Sincerely, [DrDanielle  ___] : Dr. WOODS [FreeTextEntry3] : Median Curiel MD

## 2021-09-28 NOTE — ASSESSMENT
[FreeTextEntry1] : 64 yo female has intermediate nuclear grade ductal carcinoma in situ, ER/[place] positive, s/p right breast lumpectomy with negative margins.\par \par Recommendations:\par -- Continue Anastrozole daily. Arthralgia is most likely due to AI. Will monitor.\par -- Breast exam today did not reveal palpable abnormality. She will have repeat b/l dx mammogram and US right  breast in 1/2022.  \par -- Discussed bone health management. Bone density done 3/8/21 was normal. Continue calcium and vitamin D supplement and regular exercise. \par -- Followup with Dr. Jovel and Dr. Lomeli as scheduled. \par -- Follow up with PCP for other healthcare issues.\par -- RTO for followup in 6 months. She will call if there is any new concern. \par \par \par \par

## 2021-09-28 NOTE — HISTORY OF PRESENT ILLNESS
[de-identified] : 66 yo female is referred by Dr. Jovel for consultation of adjuvant endocrine therapy. She had a screening mammo on 2020 which showed calcifications in the upper outer quadrant of the right breast and an asymmetry seen in the medial of the right breast. On 2020, dx mammo and US right breast confirmed Indeterminate right breast calcifications and asymmetries for which stereotactic guided biopsies were recommended.\par \par On 2021, stereotactic guided vacuum assisted needle core biopsies were performed:\par -  The right breast architectural distortion-medial biopsy revealed benign atrophic fatty breast tissue with nodular fat necrosis and proliferative type fibrocystic changes associated with microcalcifications.\par - The right breast right calcifications -lateral biopsy showed Ductal carcinoma in-situ (DCIS), solid and comedo types associated with calcifications, high nuclear grade, ER pos 100%, [place] pos 10%.\par \par On 2021, she had b/l breast MRI which showed 1.6 cm clumped nonmass enhancement in the upper outer right breast associated with a biopsy clip at site of biopsy-proven DCIS. Small hematoma associated with a biopsy clip in the upper inner right breast at site of biopsy-proven benign pathology. No associated suspicious enhancement at this biopsy site. No suspicious enhancing findings in the contralateral left breast. No lymphadenopathy.\par \par Heather saw Dr. Jovel for surgical consultation. She underwent right breast lumpectomy on 2021. The pathology report revealed single focus of ductal carcinoma in situ, intermediate nuclear grade, solid type. The margins were all negative for DCIS. Lobular carcinoma in situ, classic type-see was seen.  \par \par She recovered well from surgery and is here today to discuss adjuvant endocrine therapy.\par \par She is , menopause at early 50's. She does not have family h/o breast or ovarian cancer. \par \par  [de-identified] : \par 6/10/21\par Pt is here today for follow up visit. She has  DCIS  s/p right breast lumpectomy with negative margins on 2/16/21.  She completed adjuvant WBI in April 2021 and started Anastrozole on 5/1/21 which she is tolerating well. She walks 5 miles 5x/week.  Last bone density was done 3/8/21- normal.  She takes calcium and vitamin D.  She is scheduled for her mammogram on 8/2021.  She offers no new breast complaints.\par \par 9/27/21:\par Heather is here today for follow up visit. She has  DCIS  s/p right breast lumpectomy with negative margins on 2/16/21.  She completed adjuvant WBI in April 2021 and started Anastrozole on 5/1/21 which she is tolerating well. She exercises regularly. Last bone density was done 3/8/21 was normal.  She takes calcium and vitamin D.  She had right breast dx mammo in 7/2021. There was no suspicious finding. She complains feeling should aching.

## 2021-09-28 NOTE — PHYSICAL EXAM
[Fully active, able to carry on all pre-disease performance without restriction] : Status 0 - Fully active, able to carry on all pre-disease performance without restriction [Normal] : affect appropriate [de-identified] : Status post right breast lumpectomy. The surgical incision is healing well. There is no palpable abnormality.

## 2021-10-04 DIAGNOSIS — D05.11 INTRADUCTAL CARCINOMA IN SITU OF RIGHT BREAST: ICD-10-CM

## 2021-10-04 DIAGNOSIS — Z51.81 ENCOUNTER FOR THERAPEUTIC DRUG LEVEL MONITORING: ICD-10-CM

## 2022-01-19 ENCOUNTER — OUTPATIENT (OUTPATIENT)
Dept: OUTPATIENT SERVICES | Facility: HOSPITAL | Age: 67
LOS: 1 days | End: 2022-01-19

## 2022-01-19 ENCOUNTER — APPOINTMENT (OUTPATIENT)
Dept: MAMMOGRAPHY | Facility: CLINIC | Age: 67
End: 2022-01-19
Payer: COMMERCIAL

## 2022-01-19 ENCOUNTER — APPOINTMENT (OUTPATIENT)
Dept: ULTRASOUND IMAGING | Facility: CLINIC | Age: 67
End: 2022-01-19
Payer: COMMERCIAL

## 2022-01-19 DIAGNOSIS — Z98.890 OTHER SPECIFIED POSTPROCEDURAL STATES: Chronic | ICD-10-CM

## 2022-01-19 DIAGNOSIS — Z98.51 TUBAL LIGATION STATUS: Chronic | ICD-10-CM

## 2022-01-19 PROCEDURE — G0279: CPT | Mod: 26

## 2022-01-19 PROCEDURE — 76641 ULTRASOUND BREAST COMPLETE: CPT | Mod: 26,50

## 2022-01-19 PROCEDURE — 77066 DX MAMMO INCL CAD BI: CPT | Mod: 26

## 2022-03-02 ENCOUNTER — APPOINTMENT (OUTPATIENT)
Dept: ULTRASOUND IMAGING | Facility: IMAGING CENTER | Age: 67
End: 2022-03-02

## 2022-03-02 ENCOUNTER — APPOINTMENT (OUTPATIENT)
Dept: MAMMOGRAPHY | Facility: IMAGING CENTER | Age: 67
End: 2022-03-02

## 2022-04-26 ENCOUNTER — OUTPATIENT (OUTPATIENT)
Dept: OUTPATIENT SERVICES | Facility: HOSPITAL | Age: 67
LOS: 1 days | Discharge: HOME | End: 2022-04-26
Payer: COMMERCIAL

## 2022-04-26 DIAGNOSIS — Z98.890 OTHER SPECIFIED POSTPROCEDURAL STATES: Chronic | ICD-10-CM

## 2022-04-26 DIAGNOSIS — I25.10 ATHEROSCLEROTIC HEART DISEASE OF NATIVE CORONARY ARTERY WITHOUT ANGINA PECTORIS: ICD-10-CM

## 2022-04-26 DIAGNOSIS — Z98.51 TUBAL LIGATION STATUS: Chronic | ICD-10-CM

## 2022-04-26 PROCEDURE — 75571 CT HRT W/O DYE W/CA TEST: CPT | Mod: 26

## 2022-05-04 ENCOUNTER — OUTPATIENT (OUTPATIENT)
Dept: OUTPATIENT SERVICES | Facility: HOSPITAL | Age: 67
LOS: 1 days | Discharge: HOME | End: 2022-05-04
Payer: COMMERCIAL

## 2022-05-04 DIAGNOSIS — Z98.51 TUBAL LIGATION STATUS: Chronic | ICD-10-CM

## 2022-05-04 DIAGNOSIS — E07.9 DISORDER OF THYROID, UNSPECIFIED: ICD-10-CM

## 2022-05-04 DIAGNOSIS — Z98.890 OTHER SPECIFIED POSTPROCEDURAL STATES: Chronic | ICD-10-CM

## 2022-05-04 DIAGNOSIS — E04.9 NONTOXIC GOITER, UNSPECIFIED: ICD-10-CM

## 2022-05-04 PROCEDURE — 76536 US EXAM OF HEAD AND NECK: CPT | Mod: 26

## 2022-05-12 ENCOUNTER — OUTPATIENT (OUTPATIENT)
Dept: OUTPATIENT SERVICES | Facility: HOSPITAL | Age: 67
LOS: 1 days | Discharge: HOME | End: 2022-05-12
Payer: COMMERCIAL

## 2022-05-12 DIAGNOSIS — I25.10 ATHEROSCLEROTIC HEART DISEASE OF NATIVE CORONARY ARTERY WITHOUT ANGINA PECTORIS: ICD-10-CM

## 2022-05-12 DIAGNOSIS — Z98.890 OTHER SPECIFIED POSTPROCEDURAL STATES: Chronic | ICD-10-CM

## 2022-05-12 DIAGNOSIS — Z98.51 TUBAL LIGATION STATUS: Chronic | ICD-10-CM

## 2022-05-12 PROCEDURE — 78452 HT MUSCLE IMAGE SPECT MULT: CPT | Mod: 26,ME

## 2022-05-12 PROCEDURE — G1004: CPT

## 2022-05-12 PROCEDURE — 93016 CV STRESS TEST SUPVJ ONLY: CPT

## 2022-05-12 PROCEDURE — 93018 CV STRESS TEST I&R ONLY: CPT

## 2022-06-03 ENCOUNTER — NON-APPOINTMENT (OUTPATIENT)
Age: 67
End: 2022-06-03

## 2022-06-24 ENCOUNTER — APPOINTMENT (OUTPATIENT)
Dept: ORTHOPEDIC SURGERY | Facility: CLINIC | Age: 67
End: 2022-06-24
Payer: COMMERCIAL

## 2022-06-24 VITALS — HEIGHT: 62 IN | WEIGHT: 150 LBS | BODY MASS INDEX: 27.6 KG/M2

## 2022-06-24 PROCEDURE — 99213 OFFICE O/P EST LOW 20 MIN: CPT | Mod: 25

## 2022-06-24 PROCEDURE — 20610 DRAIN/INJ JOINT/BURSA W/O US: CPT

## 2022-06-24 NOTE — HISTORY OF PRESENT ILLNESS
[de-identified] :   Patient is a 66-year-old female here for repeat evaluation of her right knee.  She has a history of knee arthritis.  She gets cortisone injections periodically.  Her last cortisone injection was in July 2021. He started acting up on her again the last 2 months.

## 2022-06-24 NOTE — DISCUSSION/SUMMARY
[de-identified] :   The patient is interested in a cortisone injection.  Under sterile technique with the patient's consent, I injected 3 cc dexamethasone and 2 cc of 1% lidocaine into the lateral joint space of the right knee.  She tolerated the procedure well.  The puncture sites cover the Band-Aid.  She was advised to ice and rest the area.  We discussed the option of viscosupplementation injections.  I wrote a prescription for Synvisc 3.  She will follow up with me in 6 weeks for administration of the injections.  All her questions were answered today.  She will contact the office with any questions or concerns.

## 2022-06-24 NOTE — PHYSICAL EXAM
[de-identified] :   Physical exam of her right knee:  Negative swelling or ecchymosis.  Nontender over the patellar facet.  Negative patellar grind.  She active straight leg raise.  Full range of motion.  Mild medial and lateral joint tenderness.  Calves soft and nontender.  Sensory motor are intact.

## 2022-06-28 ENCOUNTER — OUTPATIENT (OUTPATIENT)
Dept: OUTPATIENT SERVICES | Facility: HOSPITAL | Age: 67
LOS: 1 days | End: 2022-06-28

## 2022-06-28 ENCOUNTER — APPOINTMENT (OUTPATIENT)
Dept: MRI IMAGING | Facility: CLINIC | Age: 67
End: 2022-06-28
Payer: COMMERCIAL

## 2022-06-28 DIAGNOSIS — Z98.51 TUBAL LIGATION STATUS: Chronic | ICD-10-CM

## 2022-06-28 DIAGNOSIS — Z98.890 OTHER SPECIFIED POSTPROCEDURAL STATES: Chronic | ICD-10-CM

## 2022-06-28 PROCEDURE — 77049 MRI BREAST C-+ W/CAD BI: CPT | Mod: 26

## 2022-07-27 ENCOUNTER — APPOINTMENT (OUTPATIENT)
Dept: HEMATOLOGY ONCOLOGY | Facility: CLINIC | Age: 67
End: 2022-07-27

## 2022-07-27 ENCOUNTER — OUTPATIENT (OUTPATIENT)
Dept: OUTPATIENT SERVICES | Facility: HOSPITAL | Age: 67
LOS: 1 days | Discharge: HOME | End: 2022-07-27

## 2022-07-27 ENCOUNTER — RESULT REVIEW (OUTPATIENT)
Age: 67
End: 2022-07-27

## 2022-07-27 VITALS
HEART RATE: 67 BPM | SYSTOLIC BLOOD PRESSURE: 113 MMHG | HEIGHT: 62 IN | RESPIRATION RATE: 12 BRPM | WEIGHT: 144 LBS | TEMPERATURE: 97.2 F | DIASTOLIC BLOOD PRESSURE: 60 MMHG | BODY MASS INDEX: 26.5 KG/M2 | OXYGEN SATURATION: 98 %

## 2022-07-27 DIAGNOSIS — Z98.890 OTHER SPECIFIED POSTPROCEDURAL STATES: Chronic | ICD-10-CM

## 2022-07-27 DIAGNOSIS — Z98.51 TUBAL LIGATION STATUS: Chronic | ICD-10-CM

## 2022-07-27 PROCEDURE — 99214 OFFICE O/P EST MOD 30 MIN: CPT

## 2022-07-27 NOTE — HISTORY OF PRESENT ILLNESS
[de-identified] : 64 yo female is referred by Dr. Jovel for consultation of adjuvant endocrine therapy. She had a screening mammo on 2020 which showed calcifications in the upper outer quadrant of the right breast and an asymmetry seen in the medial of the right breast. On 2020, dx mammo and US right breast confirmed Indeterminate right breast calcifications and asymmetries for which stereotactic guided biopsies were recommended.\par \par On 2021, stereotactic guided vacuum assisted needle core biopsies were performed:\par -  The right breast architectural distortion-medial biopsy revealed benign atrophic fatty breast tissue with nodular fat necrosis and proliferative type fibrocystic changes associated with microcalcifications.\par - The right breast right calcifications -lateral biopsy showed Ductal carcinoma in-situ (DCIS), solid and comedo types associated with calcifications, high nuclear grade, ER pos 100%, [place] pos 10%.\par \par On 2021, she had b/l breast MRI which showed 1.6 cm clumped nonmass enhancement in the upper outer right breast associated with a biopsy clip at site of biopsy-proven DCIS. Small hematoma associated with a biopsy clip in the upper inner right breast at site of biopsy-proven benign pathology. No associated suspicious enhancement at this biopsy site. No suspicious enhancing findings in the contralateral left breast. No lymphadenopathy.\par \par Heather saw Dr. Jovel for surgical consultation. She underwent right breast lumpectomy on 2021. The pathology report revealed single focus of ductal carcinoma in situ, intermediate nuclear grade, solid type. The margins were all negative for DCIS. Lobular carcinoma in situ, classic type-see was seen.  \par \par She recovered well from surgery and is here today to discuss adjuvant endocrine therapy.\par \par She is , menopause at early 50's. She does not have family h/o breast or ovarian cancer. \par \par  [de-identified] : \par 6/10/21\par Pt is here today for follow up visit. She has  DCIS  s/p right breast lumpectomy with negative margins on 2/16/21.  She completed adjuvant WBI in April 2021 and started Anastrozole on 5/1/21 which she is tolerating well. She walks 5 miles 5x/week.  Last bone density was done 3/8/21- normal.  She takes calcium and vitamin D.  She is scheduled for her mammogram on 8/2021.  She offers no new breast complaints.\par \par 9/27/21:\par Heather is here today for follow up visit. She has  DCIS  s/p right breast lumpectomy with negative margins on 2/16/21.  She completed adjuvant WBI in April 2021 and started Anastrozole on 5/1/21 which she is tolerating well. She exercises regularly. Last bone density was done 3/8/21 was normal.  She takes calcium and vitamin D.  She had right breast dx mammo in 7/2021. There was no suspicious finding. She complains feeling should aching.\par \par 7/27/22\par Heather is here today for follow up visit. She has  DCIS  s/p right breast lumpectomy with negative margins on 2/16/21.  She completed adjuvant WBI in April 2021 and started Anastrozole on 5/1/21 which she is tolerating well. She exercises regularly. Last bone density was done 3/8/21 was normal. She had bilateral mammogram and US 1/2022 no evidence of malignancy. She also had bilateral breast MRI 6/2022 No suspicious enhancement to warrant biopsy with expected changes from right lumpectomy.

## 2022-07-27 NOTE — ASSESSMENT
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 Refilled today. \par -- Breast exam today did not reveal palpable abnormality. b/l breast MRI in 7/2022 did not show suspicious finding. She will have repeat b/l dx mammogram and US right  breast in 1/2023.  \par -- Discussed bone health management. Bone density done 3/8/21 was normal. Continue calcium and vitamin D supplement and regular exercise. \par -- Followup with Dr. Jovel and Dr. Lomeli as scheduled. \par -- Follow up with PCP for other healthcare issues.\par -- RTO for followup in 6 months. She will call if there is any new concern. \par \par \par \par

## 2022-07-27 NOTE — PHYSICAL EXAM
[Fully active, able to carry on all pre-disease performance without restriction] : Status 0 - Fully active, able to carry on all pre-disease performance without restriction [Normal] : affect appropriate [de-identified] : Status post right breast lumpectomy. The surgical incision is healing well. There is no palpable abnormality.

## 2022-07-27 NOTE — CONSULT LETTER
[Dear  ___] : Dear  [unfilled], [Courtesy Letter:] : I had the pleasure of seeing your patient, [unfilled], in my office today. [Please see my note below.] : Please see my note below. [Sincerely,] : Sincerely, [DrDanielle  ___] : Dr. WOODS [FreeTextEntry3] : Medina Curiel MD

## 2022-07-28 DIAGNOSIS — Z51.81 ENCOUNTER FOR THERAPEUTIC DRUG LEVEL MONITORING: ICD-10-CM

## 2022-07-28 DIAGNOSIS — D05.11 INTRADUCTAL CARCINOMA IN SITU OF RIGHT BREAST: ICD-10-CM

## 2022-08-09 ENCOUNTER — APPOINTMENT (OUTPATIENT)
Dept: ORTHOPEDIC SURGERY | Facility: CLINIC | Age: 67
End: 2022-08-09

## 2022-08-16 ENCOUNTER — APPOINTMENT (OUTPATIENT)
Dept: ORTHOPEDIC SURGERY | Facility: CLINIC | Age: 67
End: 2022-08-16

## 2022-08-23 ENCOUNTER — APPOINTMENT (OUTPATIENT)
Dept: ORTHOPEDIC SURGERY | Facility: CLINIC | Age: 67
End: 2022-08-23

## 2022-08-31 RX ORDER — HYLAN G-F 20 16MG/2ML
16 SYRINGE (ML) INTRAARTICULAR
Qty: 1 | Refills: 0 | Status: ACTIVE | COMMUNITY
Start: 2022-08-27 | End: 1900-01-01

## 2022-09-21 ENCOUNTER — APPOINTMENT (OUTPATIENT)
Dept: ORTHOPEDIC SURGERY | Facility: CLINIC | Age: 67
End: 2022-09-21

## 2022-09-21 VITALS — WEIGHT: 144 LBS | HEIGHT: 62 IN | BODY MASS INDEX: 26.5 KG/M2

## 2022-09-21 PROCEDURE — 20610 DRAIN/INJ JOINT/BURSA W/O US: CPT

## 2022-09-21 NOTE — DISCUSSION/SUMMARY
[de-identified] :   Today under sterile technique with the patient's consent, I injected the 1st Synvisc 16mg/2 ml injection into the lateral joint space of the right knee.  She tolerated the procedure well.  The puncture sites cover the Band-Aid.  She was instructed to ice and rest the area.\par I will see her back next week for her 2nd injection of the series.\par All her questions were answered today.

## 2022-09-28 ENCOUNTER — APPOINTMENT (OUTPATIENT)
Dept: ORTHOPEDIC SURGERY | Facility: CLINIC | Age: 67
End: 2022-09-28

## 2022-09-28 VITALS — BODY MASS INDEX: 26.5 KG/M2 | HEIGHT: 62 IN | WEIGHT: 144 LBS

## 2022-09-28 PROCEDURE — 20610 DRAIN/INJ JOINT/BURSA W/O US: CPT

## 2022-09-28 NOTE — HISTORY OF PRESENT ILLNESS
[de-identified] : Patient is a 67-year-old female here for 2nd Synvisc injection.  She did well after the 1st injection.

## 2022-09-28 NOTE — DISCUSSION/SUMMARY
[de-identified] :   Today under sterile technique and with the patient's consent, I injected the 2nd Synvisc 3. injection to the lateral joint space of the right knee.  \par The procedure was tolerated well.\par The puncture site was covered with a Band-Aid. \par I recommend ice and rest tonight.\par Follow up next week for 3rd injection.

## 2022-10-05 ENCOUNTER — APPOINTMENT (OUTPATIENT)
Dept: ORTHOPEDIC SURGERY | Facility: CLINIC | Age: 67
End: 2022-10-05

## 2022-10-05 ENCOUNTER — RESULT CHARGE (OUTPATIENT)
Age: 67
End: 2022-10-05

## 2022-10-05 VITALS — WEIGHT: 144 LBS | BODY MASS INDEX: 26.5 KG/M2 | HEIGHT: 62 IN

## 2022-10-05 PROCEDURE — 99213 OFFICE O/P EST LOW 20 MIN: CPT | Mod: 25

## 2022-10-05 PROCEDURE — 73562 X-RAY EXAM OF KNEE 3: CPT | Mod: LT

## 2022-10-05 PROCEDURE — 20610 DRAIN/INJ JOINT/BURSA W/O US: CPT

## 2022-10-05 RX ORDER — IBUPROFEN 800 MG/1
800 TABLET ORAL 3 TIMES DAILY
Qty: 60 | Refills: 1 | Status: ACTIVE | COMMUNITY
Start: 2022-10-05 | End: 1900-01-01

## 2022-10-05 RX ORDER — HYLAN G-F 20 16MG/2ML
16 SYRINGE (ML) INTRAARTICULAR
Qty: 1 | Refills: 0 | Status: ACTIVE | COMMUNITY
Start: 2022-10-05 | End: 1900-01-01

## 2022-10-12 ENCOUNTER — APPOINTMENT (OUTPATIENT)
Dept: ORTHOPEDIC SURGERY | Facility: CLINIC | Age: 67
End: 2022-10-12

## 2022-10-15 NOTE — END OF VISIT
patient examined for cervical change  CB firmly in place  Discussed patients care at safety rounds.   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   Pitocin initially started as a means to control induction agent in the event of NRFHT.  Tracing reviewed and category 1.  Plan to switch to buccal cytotec    C. Diamant, PGY-4  d/w Dr. Jackson patient evaluated at bedside for category 2 tracing Patient seen for CB placement  Verbal consent obtained from patient, CB placed without difficulty, patient tolerated the procedure well [Time Spent: ___ minutes] : I have spent [unfilled] minutes of time on the encounter. patient examined for cervical change  recently SROM, CB out

## 2022-10-18 NOTE — DISCUSSION/SUMMARY
[de-identified] :   Today under sterile technique and with the patient's consent, I injected the 3rd Synvisc 16mg/2ml injection to the lateral joint space of the right knee knee.  \par The procedure was tolerated well.\par The puncture site was covered with a Band-Aid. \par I recommend ice and rest tonight.\par The patient understands it can take up to 6-8 weeks from today's injection to feel relief. \par \par As for her left knee, we discussed the risks and benefits of a cortisone injection.  She would like to proceed with the injection.  Under sterile technique with the patient's consent, I injected 3 cc dexamethasone and 2 cc of 1% lidocaine into the lateral joint space of the left knee.  She tolerated the procedure well.  The puncture site was covered with a Band-Aid.  She was advised to ice and rest the area.\par She is interested in viscosupplementation injections for the left knee.\par I wrote a prescription for Synvisc 3.\par She will follow up in 6 weeks for administration of the injections.\par I prescribed the patient ibuprofen 800 mg t.i.d. for the pain and inflammation.\par All questions were answered today.\par She will contact the office with any questions or concerns.

## 2022-10-18 NOTE — HISTORY OF PRESENT ILLNESS
[de-identified] :  Patient is a 67-year-old female here for her 3rd Synvisc injection in her right knee and evaluation of her left knee.  Her right knee is feeling much better.  Her left knee has been bothering her significantly for the last few days.  She denies any new or recent trauma of the left knee.

## 2022-10-18 NOTE — DATA REVIEWED
[FreeTextEntry1] :   X-rays taken the office today for left knee show tricompartmental primary osteoarthritis.

## 2022-10-18 NOTE — PHYSICAL EXAM
[de-identified] :   Physical exam of her left knee:  Mild swelling.  Negative ecchymosis.  Nontender over the patellar facet.  Negative patellar grind.  She can actively straight leg raise.  Full range of motion in the with pain.  Positive medial and lateral joint line tenderness.  Calves are soft and nontender.  Sensory and motor are intact.

## 2022-10-19 ENCOUNTER — APPOINTMENT (OUTPATIENT)
Dept: ORTHOPEDIC SURGERY | Facility: CLINIC | Age: 67
End: 2022-10-19

## 2022-10-24 ENCOUNTER — APPOINTMENT (OUTPATIENT)
Dept: SURGERY | Facility: CLINIC | Age: 67
End: 2022-10-24

## 2022-10-24 PROCEDURE — 99213K: CUSTOM

## 2022-10-26 ENCOUNTER — APPOINTMENT (OUTPATIENT)
Dept: ORTHOPEDIC SURGERY | Facility: CLINIC | Age: 67
End: 2022-10-26

## 2022-11-16 ENCOUNTER — APPOINTMENT (OUTPATIENT)
Dept: ORTHOPEDIC SURGERY | Facility: CLINIC | Age: 67
End: 2022-11-16

## 2022-11-17 ENCOUNTER — APPOINTMENT (OUTPATIENT)
Dept: ORTHOPEDIC SURGERY | Facility: CLINIC | Age: 67
End: 2022-11-17

## 2022-11-17 PROCEDURE — 20610 DRAIN/INJ JOINT/BURSA W/O US: CPT | Mod: LT

## 2022-11-17 PROCEDURE — 99212 OFFICE O/P EST SF 10 MIN: CPT | Mod: 25

## 2022-11-17 NOTE — PROCEDURE
[Large Joint Injection] : Large joint injection [Left] : of the left [Knee] : knee [Pain] : pain [X-ray evidence of Osteoarthritis on this or prior visit] : x-ray evidence of Osteoarthritis on this or prior visit [Repeat series performed] : repeat series performed [Alcohol] : alcohol [Betadine] : betadine [Ethyl Chloride sprayed topically] : ethyl chloride sprayed topically [Sterile technique used] : sterile technique used [#1] : series #1 [] : Patient tolerated procedure well

## 2022-11-17 NOTE — DISCUSSION/SUMMARY
[de-identified] : IMPRESSION: Left knee OA\par \par PLAN: Synvisc #1given\par \par FOLLOW-UP: 1 week\par \par SUPERVISING PHYSICIAN DR. CRISTOBAL\par

## 2022-11-20 ENCOUNTER — NON-APPOINTMENT (OUTPATIENT)
Age: 67
End: 2022-11-20

## 2022-11-23 ENCOUNTER — APPOINTMENT (OUTPATIENT)
Dept: ORTHOPEDIC SURGERY | Facility: CLINIC | Age: 67
End: 2022-11-23

## 2022-11-23 VITALS — HEIGHT: 62 IN | WEIGHT: 144 LBS | BODY MASS INDEX: 26.5 KG/M2

## 2022-11-23 PROCEDURE — 20610 DRAIN/INJ JOINT/BURSA W/O US: CPT

## 2022-11-23 NOTE — DISCUSSION/SUMMARY
[de-identified] : Today under sterile technique and with the patient's consent, I injected the 2nd Synvisc 3 injection to the lateral joint space of the left knee.  \par The procedure was tolerated well.\par The puncture site was covered with a Band-Aid. \par I recommend ice and rest tonight.\par Follow up next week for 3rd injection.

## 2022-11-23 NOTE — HISTORY OF PRESENT ILLNESS
[de-identified] : Patient is a 66 yo female here for her second synvisc injection of her left knee. She is doing well.

## 2022-11-25 ENCOUNTER — NON-APPOINTMENT (OUTPATIENT)
Age: 67
End: 2022-11-25

## 2022-11-30 ENCOUNTER — APPOINTMENT (OUTPATIENT)
Dept: ORTHOPEDIC SURGERY | Facility: CLINIC | Age: 67
End: 2022-11-30

## 2022-12-06 ENCOUNTER — APPOINTMENT (OUTPATIENT)
Dept: ORTHOPEDIC SURGERY | Facility: CLINIC | Age: 67
End: 2022-12-06

## 2022-12-06 VITALS — WEIGHT: 144 LBS | BODY MASS INDEX: 26.5 KG/M2 | HEIGHT: 62 IN

## 2022-12-06 PROCEDURE — 20610 DRAIN/INJ JOINT/BURSA W/O US: CPT

## 2022-12-06 NOTE — HISTORY OF PRESENT ILLNESS
[de-identified] : Patient is a 67-year-old female here for her 3rd Synvisc injection in her left knee.  She did really well with the last 2 injections and with this ears on her right knee.

## 2022-12-06 NOTE — DISCUSSION/SUMMARY
[de-identified] :   Today under sterile technique and with the patient's consent, I injected the 3rd Synvisc 3 injection to the lateral joint space of the left knee.  \par The procedure was tolerated well.\par The puncture site was covered with a Band-Aid. \par I recommend ice and rest tonight.\par The patient understands it can take up to 6-8 weeks from today's injection to feel relief. \par She will follow up in 6 months with Tonya Silva.  All her questions were answered today.\par She will call with any questions or concerns.

## 2022-12-12 ENCOUNTER — OUTPATIENT (OUTPATIENT)
Dept: OUTPATIENT SERVICES | Facility: HOSPITAL | Age: 67
LOS: 1 days | Discharge: HOME | End: 2022-12-12

## 2022-12-12 ENCOUNTER — APPOINTMENT (OUTPATIENT)
Dept: OBGYN | Facility: CLINIC | Age: 67
End: 2022-12-12

## 2022-12-12 VITALS
SYSTOLIC BLOOD PRESSURE: 120 MMHG | HEIGHT: 62 IN | WEIGHT: 148 LBS | DIASTOLIC BLOOD PRESSURE: 72 MMHG | BODY MASS INDEX: 27.23 KG/M2

## 2022-12-12 DIAGNOSIS — Z98.890 OTHER SPECIFIED POSTPROCEDURAL STATES: Chronic | ICD-10-CM

## 2022-12-12 DIAGNOSIS — Z01.419 ENCOUNTER FOR GYNECOLOGICAL EXAMINATION (GENERAL) (ROUTINE) W/OUT ABNORMAL FINDINGS: ICD-10-CM

## 2022-12-12 DIAGNOSIS — Z98.51 TUBAL LIGATION STATUS: Chronic | ICD-10-CM

## 2022-12-12 PROCEDURE — 99397 PER PM REEVAL EST PAT 65+ YR: CPT

## 2022-12-12 NOTE — HISTORY OF PRESENT ILLNESS
[FreeTextEntry1] : Here for annual gyn exam. Diagnosed with Ductal carcinoma in Situ of the breast last year. It  was ER/HI+ s/p right breast lumpectomy, radiation, and now on tamoxifen. \par \par  [Patient reported mammogram was normal] : Patient reported mammogram was normal [Patient reported PAP Smear was normal] : Patient reported PAP Smear was normal [Mammogramdate] : 2021 [PapSmeardate] : 2020

## 2023-01-26 ENCOUNTER — APPOINTMENT (OUTPATIENT)
Dept: HEMATOLOGY ONCOLOGY | Facility: CLINIC | Age: 68
End: 2023-01-26

## 2023-02-21 ENCOUNTER — NON-APPOINTMENT (OUTPATIENT)
Age: 68
End: 2023-02-21

## 2023-04-05 ENCOUNTER — APPOINTMENT (OUTPATIENT)
Dept: ULTRASOUND IMAGING | Facility: CLINIC | Age: 68
End: 2023-04-05
Payer: COMMERCIAL

## 2023-04-05 ENCOUNTER — RESULT REVIEW (OUTPATIENT)
Age: 68
End: 2023-04-05

## 2023-04-05 ENCOUNTER — APPOINTMENT (OUTPATIENT)
Dept: MAMMOGRAPHY | Facility: CLINIC | Age: 68
End: 2023-04-05
Payer: COMMERCIAL

## 2023-04-05 ENCOUNTER — OUTPATIENT (OUTPATIENT)
Dept: OUTPATIENT SERVICES | Facility: HOSPITAL | Age: 68
LOS: 1 days | End: 2023-04-05

## 2023-04-05 DIAGNOSIS — Z98.890 OTHER SPECIFIED POSTPROCEDURAL STATES: Chronic | ICD-10-CM

## 2023-04-05 DIAGNOSIS — Z98.51 TUBAL LIGATION STATUS: Chronic | ICD-10-CM

## 2023-04-05 PROCEDURE — G0279: CPT | Mod: 26

## 2023-04-05 PROCEDURE — 77066 DX MAMMO INCL CAD BI: CPT | Mod: 26

## 2023-04-05 PROCEDURE — 76641 ULTRASOUND BREAST COMPLETE: CPT | Mod: 26,50

## 2023-04-12 ENCOUNTER — APPOINTMENT (OUTPATIENT)
Dept: HEMATOLOGY ONCOLOGY | Facility: CLINIC | Age: 68
End: 2023-04-12

## 2023-04-28 ENCOUNTER — APPOINTMENT (OUTPATIENT)
Age: 68
End: 2023-04-28
Payer: COMMERCIAL

## 2023-04-28 ENCOUNTER — OUTPATIENT (OUTPATIENT)
Dept: OUTPATIENT SERVICES | Facility: HOSPITAL | Age: 68
LOS: 1 days | End: 2023-04-28
Payer: COMMERCIAL

## 2023-04-28 DIAGNOSIS — Z98.890 OTHER SPECIFIED POSTPROCEDURAL STATES: Chronic | ICD-10-CM

## 2023-04-28 DIAGNOSIS — Z00.8 ENCOUNTER FOR OTHER GENERAL EXAMINATION: ICD-10-CM

## 2023-04-28 DIAGNOSIS — E78.00 PURE HYPERCHOLESTEROLEMIA, UNSPECIFIED: ICD-10-CM

## 2023-04-28 DIAGNOSIS — C50.919 MALIGNANT NEOPLASM OF UNSPECIFIED SITE OF UNSPECIFIED FEMALE BREAST: ICD-10-CM

## 2023-04-28 DIAGNOSIS — Z98.51 TUBAL LIGATION STATUS: Chronic | ICD-10-CM

## 2023-04-28 DIAGNOSIS — J44.9 CHRONIC OBSTRUCTIVE PULMONARY DISEASE, UNSPECIFIED: ICD-10-CM

## 2023-04-28 PROCEDURE — 76536 US EXAM OF HEAD AND NECK: CPT

## 2023-04-28 PROCEDURE — 93306 TTE W/DOPPLER COMPLETE: CPT | Mod: 26

## 2023-04-28 PROCEDURE — 76536 US EXAM OF HEAD AND NECK: CPT | Mod: 26

## 2023-04-28 PROCEDURE — 93306 TTE W/DOPPLER COMPLETE: CPT

## 2023-04-29 DIAGNOSIS — E78.00 PURE HYPERCHOLESTEROLEMIA, UNSPECIFIED: ICD-10-CM

## 2023-04-29 DIAGNOSIS — C50.919 MALIGNANT NEOPLASM OF UNSPECIFIED SITE OF UNSPECIFIED FEMALE BREAST: ICD-10-CM

## 2023-04-29 DIAGNOSIS — J44.9 CHRONIC OBSTRUCTIVE PULMONARY DISEASE, UNSPECIFIED: ICD-10-CM

## 2023-05-19 ENCOUNTER — APPOINTMENT (OUTPATIENT)
Dept: ORTHOPEDIC SURGERY | Facility: CLINIC | Age: 68
End: 2023-05-19
Payer: COMMERCIAL

## 2023-05-19 PROCEDURE — 99213 OFFICE O/P EST LOW 20 MIN: CPT | Mod: 25

## 2023-05-19 PROCEDURE — 20610 DRAIN/INJ JOINT/BURSA W/O US: CPT | Mod: 50

## 2023-05-19 PROCEDURE — 73562 X-RAY EXAM OF KNEE 3: CPT | Mod: RT

## 2023-05-19 RX ORDER — MELOXICAM 15 MG/1
15 TABLET ORAL
Qty: 30 | Refills: 0 | Status: ACTIVE | COMMUNITY
Start: 2023-05-19 | End: 1900-01-01

## 2023-05-19 NOTE — HISTORY OF PRESENT ILLNESS
[de-identified] : Patient is here for evaluation of bilateral knee pain, patient is aware that she has osteoarthritis to her knees.\par Patient had gel injection to her knee in the past, patient states that she had more pain after the gel injection.\par At this time she also complains of unable to fully extend her left knee.

## 2023-05-19 NOTE — DISCUSSION/SUMMARY
[de-identified] : Impression: Bilateral knee osteoarthritis and joint effusion to the left knee\par \par Plan: She was advised for any aspiration to the left knee, patient agrees.\par 7 cc of fluid was aspirated to the left knee, patient tolerated well\par \par Risk and benefits of cortisone injection were discussed with the patient, after verbal consent knee was prepped with alcohol and Betadine.\par Cortisone injection to the bilateral knee was given using a sterile technique.\par Ethyl chloride was used as an anesthetic.  \par Injection was given using the inferior lateral portal.\par 10 mg of dexamethasone and 50 mg of 2% lidocaine were given.\par Patient tolerated injection well.\par Patient was advised that if any redness, or increased pain to give a call to the office.\par Patient is aware that on the 3 cortisone injections a year are allowed.\par \par At this time I advised for physical therapy, prescription will be given.\par \par Patient is requesting a handicap parking permit.\par \par \par Follow-up: 8 weeks for repeat evaluation\par \par

## 2023-05-19 NOTE — IMAGING
[de-identified] : On examination of the right left knee, patient has a mild joint effusion to the right and a little more pronounced on the left.\par Patient has decreased extension of the left knee possibly due to the knee effusion.\par Patient has synovial thickening to the knees bilaterally.\par Significant tenderness when palpating over the medial joint line bilaterally.\par Patient has an antalgic gait.  Patient said that she been using a cane recently due to the patient of the knees.\par Patient has good motor strength to knee flexion extension bilaterally.\par No signs of instability bilaterally.\par \par \par X-ray of the right and left knee were done in the office today showing severe osteoarthritis to the knee especially over the patellofemoral compartment and over the medial joint.  No acute fracture or dislocation

## 2023-06-23 ENCOUNTER — APPOINTMENT (OUTPATIENT)
Dept: ORTHOPEDIC SURGERY | Facility: CLINIC | Age: 68
End: 2023-06-23
Payer: COMMERCIAL

## 2023-06-23 PROCEDURE — 99213 OFFICE O/P EST LOW 20 MIN: CPT

## 2023-06-23 NOTE — HISTORY OF PRESENT ILLNESS
[de-identified] : Patient is here for evaluation of bilateral knee pain, patient is aware that she has osteoarthritis to her knees.\par Patient had gel injection to her knee in the past, patient states that she had more pain after the gel injection.\par At this time she states her right knee is worse than left.\par She states that her pain when from 9/10 to 5/10.\par She takes Mobic for pain when needed.\par \par \par

## 2023-06-23 NOTE — IMAGING
[de-identified] : On examination of the right left knee, patient has a mild joint effusion to the right and a little more pronounced on the left.\par Patient has decreased extension of the left knee possibly due to the knee effusion.\par Patient has synovial thickening to the knees bilaterally.\par Significant tenderness when palpating over the medial joint line bilaterally.\par Patient has an antalgic gait.  Patient said that she been using a cane recently due to the patient of the knees.\par Patient has good motor strength to knee flexion extension bilaterally.\par No signs of instability bilaterally.\par \par \par X-ray of the right and left knee were done in the office today showing severe osteoarthritis to the knee especially over the patellofemoral compartment and over the medial joint.  No acute fracture or dislocation

## 2023-06-23 NOTE — DISCUSSION/SUMMARY
[de-identified] : Impression: Bilateral knee osteoarthritis\par \par Plan: Cortisone injections every 4 months if the pain were to be worse she will call to start authorization for Zilretta injections\par \par Follow-up: September 2023 for possible cortisone injections\par \par

## 2023-06-27 ENCOUNTER — APPOINTMENT (OUTPATIENT)
Dept: ORTHOPEDIC SURGERY | Facility: CLINIC | Age: 68
End: 2023-06-27
Payer: COMMERCIAL

## 2023-06-27 VITALS — HEIGHT: 62 IN | WEIGHT: 155 LBS | BODY MASS INDEX: 28.52 KG/M2

## 2023-06-27 DIAGNOSIS — Z86.79 PERSONAL HISTORY OF OTHER DISEASES OF THE CIRCULATORY SYSTEM: ICD-10-CM

## 2023-06-27 DIAGNOSIS — M17.11 UNILATERAL PRIMARY OSTEOARTHRITIS, RIGHT KNEE: ICD-10-CM

## 2023-06-27 PROCEDURE — 99215 OFFICE O/P EST HI 40 MIN: CPT

## 2023-06-27 NOTE — HISTORY OF PRESENT ILLNESS
[Left Leg] : left leg [Right Leg] : right leg [Gradual] : gradual [8] : 8 [7] : 7 [Localized] : localized [Constant] : constant [Household chores] : household chores [Standing] : standing [Walking] : walking [Stairs] : stairs [] : yes [de-identified] : 06/27/2023 pt presents here today with bl knees pain for years\par no injury\par pt has try viso in the past with no relief  [FreeTextEntry1] : knees  [FreeTextEntry5] : no injury  [de-identified] : domonique bradley np [de-identified] : visco

## 2023-06-27 NOTE — PHYSICAL EXAM
[Right] : right knee [] : ambulation with cane [TWNoteComboBox7] : flexion 120 degrees [de-identified] : extension 5 degrees

## 2023-06-27 NOTE — ASSESSMENT
[FreeTextEntry1] : 67 year F WITH MODERATE B/L KNEE PAIN, R>L. HAS TRIED CIS AND GEL INJECTIONS IN THE PAST WITH SOME RELIEF. PAIN WORSENS WITH STAIRS AND WALKING PROLONGED DISTANCES. PAIN IS AFFECTING ADL AND FUNCTIONAL ACTIVITIES. LT KNEE XRAYS FROM 10/5/22 REVIEWED WITH ADVANCED MEDIAL OA. RT KNEE XRAYS FROM 5/19/23 REVIEWED WITH ADVANCED MEDIAL OA. 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 TREATMENT OPTIONS REVIEWED. QUESTIONS ANSWERED. RT TKA DISCUSSED AT LENGTH. \par \par PMHx: HTN, H/O BREAST CANCER, \par NO METAL ALLERGIES\par NO H/O DM\par NO H/O DVT/PE \par \par \par RT TKA - \par \par DISCUSSED R&B OF TKA. WILL ORDER CT TO EVAL FOR BONE LOSS AND DEFORMITY FOR PRE-OP PLANNING. \par \par The patient was advised of the diagnosis. The natural history of the pathology was explained in full to the patient in layman's terms. All questions were answered. The risks and benefits of surgical and non-surgical treatment alternatives were explained in full to the patient. \par \par We discussed my findings and the natural history of their condition. We talked about the details of the proposed surgery and the recovery. We discussed the material risks, possible benefits and alternatives to surgery. The risks include but are not limited to infection, bleeding and possible need for blood transfusion, fracture, bowel blockage, bladder retention or infection, need for reoperation, stiffness and/or limited range of motion, possible damage to nerves and blood vessels, failure of fixation of components, risk of deep vein thromboses and pulmonary embolism, wound healing problems, dislocation, and possible leg length discrepancy. Although incredibly rare, we also discussed the risks of a cardiac event, stroke and even death during, or following, the surgery. We discussed the type of implants the patient will be receiving and the type of fixation that will be used, as well as whether a robot or computer navigation aide will be used. The patient understands they will need medical clearance and will attend a preoperative joint education class. We also discussed the type of anesthesia they will receive, and the risks associated with hospital or rehab length of stay, obesity, diabetes and smoking.\par \par Patient Complains of pain in the affected joint with a level that often reaches greater than a 8/10. The pain has been progressively worsening throughout his/her treatment course. The pain has interfered with their ADLs and worsens with weight bearing. On exam they often have episodes of swelling/effusion with limited ROM. Pain worsens with ROM passive and active and I can palpate crepitus. \par \par X- rays were reviewed with the patient and they show joint space narrowing, subchondral sclerosis, osteophyte formation, and subchondral cysts. After a period of more than 12 weeks physical therapy or exercise program done with me or another treating physician they have continued pain. The patient has failed a trial of NSAID medication or pain relievers if they were unable to tolerate NSAID medications as well as a series of injections, steroids, or hyaluronic acid. After a long discussion with the patient both the patient and I have decided we have exhausted all forms of less radical treatments and they would like to proceed with Total Joint Replacement.

## 2023-07-04 ENCOUNTER — FORM ENCOUNTER (OUTPATIENT)
Age: 68
End: 2023-07-04

## 2023-07-06 ENCOUNTER — OUTPATIENT (OUTPATIENT)
Dept: OUTPATIENT SERVICES | Facility: HOSPITAL | Age: 68
LOS: 1 days | End: 2023-07-06
Payer: COMMERCIAL

## 2023-07-06 ENCOUNTER — APPOINTMENT (OUTPATIENT)
Dept: HEMATOLOGY ONCOLOGY | Facility: CLINIC | Age: 68
End: 2023-07-06
Payer: COMMERCIAL

## 2023-07-06 VITALS
RESPIRATION RATE: 18 BRPM | DIASTOLIC BLOOD PRESSURE: 72 MMHG | HEIGHT: 62 IN | BODY MASS INDEX: 27.97 KG/M2 | SYSTOLIC BLOOD PRESSURE: 119 MMHG | HEART RATE: 72 BPM | WEIGHT: 152 LBS | TEMPERATURE: 98.6 F

## 2023-07-06 DIAGNOSIS — Z98.890 OTHER SPECIFIED POSTPROCEDURAL STATES: Chronic | ICD-10-CM

## 2023-07-06 DIAGNOSIS — Z98.51 TUBAL LIGATION STATUS: Chronic | ICD-10-CM

## 2023-07-06 DIAGNOSIS — M25.569 PAIN IN UNSPECIFIED KNEE: ICD-10-CM

## 2023-07-06 DIAGNOSIS — D05.11 INTRADUCTAL CARCINOMA IN SITU OF RIGHT BREAST: ICD-10-CM

## 2023-07-06 PROCEDURE — 99214 OFFICE O/P EST MOD 30 MIN: CPT

## 2023-07-06 RX ORDER — DIAZEPAM 2 MG/1
2 TABLET ORAL
Qty: 2 | Refills: 0 | Status: ACTIVE | COMMUNITY
Start: 2023-07-06 | End: 1900-01-01

## 2023-07-06 NOTE — HISTORY OF PRESENT ILLNESS
[de-identified] : 64 yo female is referred by Dr. Jovel for consultation of adjuvant endocrine therapy. She had a screening mammo on 2020 which showed calcifications in the upper outer quadrant of the right breast and an asymmetry seen in the medial of the right breast. On 2020, dx mammo and US right breast confirmed Indeterminate right breast calcifications and asymmetries for which stereotactic guided biopsies were recommended.\par \par On 2021, stereotactic guided vacuum assisted needle core biopsies were performed:\par -  The right breast architectural distortion-medial biopsy revealed benign atrophic fatty breast tissue with nodular fat necrosis and proliferative type fibrocystic changes associated with microcalcifications.\par - The right breast right calcifications -lateral biopsy showed Ductal carcinoma in-situ (DCIS), solid and comedo types associated with calcifications, high nuclear grade, ER pos 100%, WV pos 10%.\par \par On 2021, she had b/l breast MRI which showed 1.6 cm clumped nonmass enhancement in the upper outer right breast associated with a biopsy clip at site of biopsy-proven DCIS. Small hematoma associated with a biopsy clip in the upper inner right breast at site of biopsy-proven benign pathology. No associated suspicious enhancement at this biopsy site. No suspicious enhancing findings in the contralateral left breast. No lymphadenopathy.\par \par Heather saw Dr. Jovel for surgical consultation. She underwent right breast lumpectomy on 2021. The pathology report revealed single focus of ductal carcinoma in situ, intermediate nuclear grade, solid type. The margins were all negative for DCIS. Lobular carcinoma in situ, classic type-see was seen.  \par \par She recovered well from surgery and is here today to discuss adjuvant endocrine therapy.\par \par She is , menopause at early 50's. She does not have family h/o breast or ovarian cancer. \par \par  [de-identified] : \par 6/10/21\par Pt is here today for follow up visit. She has  DCIS  s/p right breast lumpectomy with negative margins on 2/16/21.  She completed adjuvant WBI in April 2021 and started Anastrozole on 5/1/21 which she is tolerating well. She walks 5 miles 5x/week.  Last bone density was done 3/8/21- normal.  She takes calcium and vitamin D.  She is scheduled for her mammogram on 8/2021.  She offers no new breast complaints.\par \par 9/27/21:\par Heather is here today for follow up visit. She has  DCIS  s/p right breast lumpectomy with negative margins on 2/16/21.  She completed adjuvant WBI in April 2021 and started Anastrozole on 5/1/21 which she is tolerating well. She exercises regularly. Last bone density was done 3/8/21 was normal.  She takes calcium and vitamin D.  She had right breast dx mammo in 7/2021. There was no suspicious finding. She complains feeling should aching.\par \par 7/27/22\par Heather is here today for follow up visit. She has  DCIS  s/p right breast lumpectomy with negative margins on 2/16/21.  She completed adjuvant WBI in April 2021 and started Anastrozole on 5/1/21 which she is tolerating well. She exercises regularly. Last bone density was done 3/8/21 was normal. She had bilateral mammogram and US 1/2022 no evidence of malignancy. She also had bilateral breast MRI 6/2022 No suspicious enhancement to warrant biopsy with expected changes from right lumpectomy. \par \par 7/6/23:\par Heather is here today for follow up visit. She has  DCIS  s/p right breast lumpectomy with negative margins on 2/16/21. She completed adjuvant WBI in April 2021. She has been on adjuvant endocrine therapy with Anastrozole since 5/1/2021. She had bilateral mammogram and US 4/2023. There was no suspicious finding. She has b/l knee pain and is going to have knee replacement.

## 2023-07-06 NOTE — ASSESSMENT
[FreeTextEntry1] : 68 yo female has intermediate nuclear grade ductal carcinoma in situ, ER/PA positive, s/p right breast lumpectomy with negative margins.\par \par Assessment and Plan:\par -- Continue Anastrozole daily.  \par -- Breast exam today did not reveal palpable abnormality.  bilateral mammogram and US 4/2023 did not suspicious finding. She will resume annual screening mammo.\par -- B/L knee pain. Scheduled for knee replacement. Followup with orthopedics. \par -- Will refer her for repeat bone density.  Continue calcium and vitamin D supplement and regular exercise. \par -- Followup with Dr. Jovel and Dr. Lomeli as scheduled. \par -- Follow up with PCP for other healthcare issues.\par -- RTO for followup in 6 months. She will call if there is any new concern. \par \par \par \par

## 2023-07-06 NOTE — PHYSICAL EXAM
[Fully active, able to carry on all pre-disease performance without restriction] : Status 0 - Fully active, able to carry on all pre-disease performance without restriction [Normal] : affect appropriate [de-identified] : Status post right breast lumpectomy. The surgical incision is healing well. There is no palpable abnormality.

## 2023-07-07 DIAGNOSIS — M25.569 PAIN IN UNSPECIFIED KNEE: ICD-10-CM

## 2023-07-07 DIAGNOSIS — Z51.81 ENCOUNTER FOR THERAPEUTIC DRUG LEVEL MONITORING: ICD-10-CM

## 2023-07-07 DIAGNOSIS — D05.11 INTRADUCTAL CARCINOMA IN SITU OF RIGHT BREAST: ICD-10-CM

## 2023-07-13 ENCOUNTER — OUTPATIENT (OUTPATIENT)
Dept: OUTPATIENT SERVICES | Facility: HOSPITAL | Age: 68
LOS: 1 days | End: 2023-07-13
Payer: COMMERCIAL

## 2023-07-13 ENCOUNTER — RESULT REVIEW (OUTPATIENT)
Age: 68
End: 2023-07-13

## 2023-07-13 DIAGNOSIS — Z00.8 ENCOUNTER FOR OTHER GENERAL EXAMINATION: ICD-10-CM

## 2023-07-13 DIAGNOSIS — Z98.51 TUBAL LIGATION STATUS: Chronic | ICD-10-CM

## 2023-07-13 DIAGNOSIS — Z13.820 ENCOUNTER FOR SCREENING FOR OSTEOPOROSIS: ICD-10-CM

## 2023-07-13 DIAGNOSIS — Z98.890 OTHER SPECIFIED POSTPROCEDURAL STATES: Chronic | ICD-10-CM

## 2023-07-13 PROCEDURE — 77080 DXA BONE DENSITY AXIAL: CPT

## 2023-07-14 DIAGNOSIS — Z13.820 ENCOUNTER FOR SCREENING FOR OSTEOPOROSIS: ICD-10-CM

## 2023-08-18 ENCOUNTER — OUTPATIENT (OUTPATIENT)
Dept: OUTPATIENT SERVICES | Facility: HOSPITAL | Age: 68
LOS: 1 days | Discharge: HOME | End: 2023-08-18
Payer: COMMERCIAL

## 2023-08-18 VITALS
RESPIRATION RATE: 17 BRPM | SYSTOLIC BLOOD PRESSURE: 134 MMHG | WEIGHT: 153.88 LBS | OXYGEN SATURATION: 100 % | HEIGHT: 65.5 IN | HEART RATE: 55 BPM | DIASTOLIC BLOOD PRESSURE: 70 MMHG | TEMPERATURE: 98 F

## 2023-08-18 DIAGNOSIS — Z98.890 OTHER SPECIFIED POSTPROCEDURAL STATES: Chronic | ICD-10-CM

## 2023-08-18 DIAGNOSIS — M17.11 UNILATERAL PRIMARY OSTEOARTHRITIS, RIGHT KNEE: ICD-10-CM

## 2023-08-18 DIAGNOSIS — Z01.818 ENCOUNTER FOR OTHER PREPROCEDURAL EXAMINATION: ICD-10-CM

## 2023-08-18 DIAGNOSIS — I10 ESSENTIAL (PRIMARY) HYPERTENSION: ICD-10-CM

## 2023-08-18 DIAGNOSIS — Z98.51 TUBAL LIGATION STATUS: Chronic | ICD-10-CM

## 2023-08-18 LAB
A1C WITH ESTIMATED AVERAGE GLUCOSE RESULT: 6.1 % — HIGH (ref 4–5.6)
ALBUMIN SERPL ELPH-MCNC: 4.2 G/DL — SIGNIFICANT CHANGE UP (ref 3.3–5)
ALP SERPL-CCNC: 71 U/L — SIGNIFICANT CHANGE UP (ref 40–120)
ALT FLD-CCNC: 27 U/L — SIGNIFICANT CHANGE UP (ref 12–78)
ANION GAP SERPL CALC-SCNC: 2 MMOL/L — LOW (ref 5–17)
APTT BLD: 28.7 SEC — SIGNIFICANT CHANGE UP (ref 24.5–35.6)
AST SERPL-CCNC: 18 U/L — SIGNIFICANT CHANGE UP (ref 15–37)
BASOPHILS # BLD AUTO: 0.05 K/UL — SIGNIFICANT CHANGE UP (ref 0–0.2)
BASOPHILS NFR BLD AUTO: 0.8 % — SIGNIFICANT CHANGE UP (ref 0–2)
BILIRUB SERPL-MCNC: 0.5 MG/DL — SIGNIFICANT CHANGE UP (ref 0.2–1.2)
BUN SERPL-MCNC: 17 MG/DL — SIGNIFICANT CHANGE UP (ref 7–23)
CALCIUM SERPL-MCNC: 9.6 MG/DL — SIGNIFICANT CHANGE UP (ref 8.5–10.1)
CHLORIDE SERPL-SCNC: 106 MMOL/L — SIGNIFICANT CHANGE UP (ref 96–108)
CO2 SERPL-SCNC: 34 MMOL/L — HIGH (ref 22–31)
CREAT SERPL-MCNC: 0.82 MG/DL — SIGNIFICANT CHANGE UP (ref 0.5–1.3)
EGFR: 78 ML/MIN/1.73M2 — SIGNIFICANT CHANGE UP
EOSINOPHIL # BLD AUTO: 0.09 K/UL — SIGNIFICANT CHANGE UP (ref 0–0.5)
EOSINOPHIL NFR BLD AUTO: 1.4 % — SIGNIFICANT CHANGE UP (ref 0–6)
ESTIMATED AVERAGE GLUCOSE: 128 MG/DL — HIGH (ref 68–114)
GLUCOSE SERPL-MCNC: 97 MG/DL — SIGNIFICANT CHANGE UP (ref 70–99)
HCT VFR BLD CALC: 39.7 % — SIGNIFICANT CHANGE UP (ref 34.5–45)
HGB BLD-MCNC: 13.4 G/DL — SIGNIFICANT CHANGE UP (ref 11.5–15.5)
IMM GRANULOCYTES NFR BLD AUTO: 0.2 % — SIGNIFICANT CHANGE UP (ref 0–0.9)
INR BLD: 0.87 RATIO — SIGNIFICANT CHANGE UP (ref 0.85–1.18)
LYMPHOCYTES # BLD AUTO: 1.95 K/UL — SIGNIFICANT CHANGE UP (ref 1–3.3)
LYMPHOCYTES # BLD AUTO: 29.4 % — SIGNIFICANT CHANGE UP (ref 13–44)
MCHC RBC-ENTMCNC: 29.3 PG — SIGNIFICANT CHANGE UP (ref 27–34)
MCHC RBC-ENTMCNC: 33.8 G/DL — SIGNIFICANT CHANGE UP (ref 32–36)
MCV RBC AUTO: 86.7 FL — SIGNIFICANT CHANGE UP (ref 80–100)
MONOCYTES # BLD AUTO: 0.63 K/UL — SIGNIFICANT CHANGE UP (ref 0–0.9)
MONOCYTES NFR BLD AUTO: 9.5 % — SIGNIFICANT CHANGE UP (ref 2–14)
MRSA PCR RESULT.: SIGNIFICANT CHANGE UP
NEUTROPHILS # BLD AUTO: 3.9 K/UL — SIGNIFICANT CHANGE UP (ref 1.8–7.4)
NEUTROPHILS NFR BLD AUTO: 58.7 % — SIGNIFICANT CHANGE UP (ref 43–77)
NRBC # BLD: 0 /100 WBCS — SIGNIFICANT CHANGE UP (ref 0–0)
PLATELET # BLD AUTO: 220 K/UL — SIGNIFICANT CHANGE UP (ref 150–400)
POTASSIUM SERPL-MCNC: 3.2 MMOL/L — LOW (ref 3.5–5.3)
POTASSIUM SERPL-SCNC: 3.2 MMOL/L — LOW (ref 3.5–5.3)
PROT SERPL-MCNC: 7.5 GM/DL — SIGNIFICANT CHANGE UP (ref 6–8.3)
PROTHROM AB SERPL-ACNC: 10.4 SEC — SIGNIFICANT CHANGE UP (ref 9.5–13)
RBC # BLD: 4.58 M/UL — SIGNIFICANT CHANGE UP (ref 3.8–5.2)
RBC # FLD: 13 % — SIGNIFICANT CHANGE UP (ref 10.3–14.5)
S AUREUS DNA NOSE QL NAA+PROBE: SIGNIFICANT CHANGE UP
SODIUM SERPL-SCNC: 142 MMOL/L — SIGNIFICANT CHANGE UP (ref 135–145)
VIT D25+D1,25 OH+D1,25 PNL SERPL-MCNC: 50.6 PG/ML — SIGNIFICANT CHANGE UP (ref 19.9–79.3)
WBC # BLD: 6.63 K/UL — SIGNIFICANT CHANGE UP (ref 3.8–10.5)
WBC # FLD AUTO: 6.63 K/UL — SIGNIFICANT CHANGE UP (ref 3.8–10.5)

## 2023-08-18 PROCEDURE — 93010 ELECTROCARDIOGRAM REPORT: CPT

## 2023-08-18 NOTE — H&P PST ADULT - HISTORY OF PRESENT ILLNESS
69 yo female, pmh- breast cancer  had right  breast lumpectomy and  chemo in 2021 on anastrozole , htn, gerd  c/o right knee pain 2/2 osteoarthritis - scheduled for right  knee arthroplasty  goal: to walk without pain   denies recent travels in the past 30 days. No fever, SOB, cough, flu like symptoms or body rash- covid screen

## 2023-08-18 NOTE — H&P PST ADULT - PROBLEM SELECTOR PLAN 3
Assessment and Plan: labs - cbc,pt/ptt,bmp,t&s,nose cx,ekg  M/C required and oncologist   preop 3 day hibiclens instruction reviewed and given .instructed on if  nose cx positive use mupuricin 5 days and checklist given  take routine meds DOS with sips of water. avoid NSAID and OTC supplements. verbalized understanding  information on proper nutrition , increase protein and better food choices provided in packet  ensure clear  anesthesiologist to review pst labs, ekg, medical clearances and optimization for surgery

## 2023-08-18 NOTE — H&P PST ADULT - NSICDXPASTSURGICALHX_GEN_ALL_CORE_FT
PAST SURGICAL HISTORY:   delivery delivered     H/O arthroscopy of right knee     H/O right breast biopsy     H/O tubal ligation

## 2023-08-18 NOTE — PHYSICAL THERAPY INITIAL EVALUATION ADULT - ADDITIONAL COMMENTS
Pt lives with her son who works in a private home with 22 entry steps (B/L rails, far apart to reach both at the same time), no steps inside home.  Pt has a tub/shower combo with a fixed shower head, standard toilet seat height, & no grab bars. Pt states she is currently independent with all functional mobility including community ambulation with SAC. Pt states she is independent with ADL's as well. Pt reports daily 0/10 pain & states it is worse 7/10 with any activity. Pt is right hand dominant, wears eye glasses, and drives.  Pt reports she has the most difficulty time "getting up & moving around" after prolonged sitting. Pt endorses taking tylenol for pain management. Goal of therapy: manage pain & improve functional mobility.

## 2023-08-18 NOTE — PHYSICAL THERAPY INITIAL EVALUATION ADULT - PERTINENT HX OF CURRENT PROBLEM, REHAB EVAL
Patient attends pre-op testing today following consult c  due to chronic pain to right knee. Elective right TKR is now scheduled in this facility for 9/6/23

## 2023-08-18 NOTE — H&P PST ADULT - ASSESSMENT
right knee osteoarthritis  CAPRINI SCORE    AGE RELATED RISK FACTORS                                                       MOBILITY RELATED FACTORS  [ ] Age 41-60 years                                            (1 Point)                  [ ] Bed rest                                                        (1 Point)  [x ] Age: 61-74 years                                           (2 Points)                [ ] Plaster cast                                                   (2 Points)  [ ] Age= 75 years                                              (3 Points)                 [ ] Bed bound for more than 72 hours                   (2 Points)    DISEASE RELATED RISK FACTORS                                               GENDER SPECIFIC FACTORS  [ ] Edema in the lower extremities                       (1 Point)                  [ ] Pregnancy                                                     (1 Point)  [ ] Varicose veins                                               (1 Point)                  [ ] Post-partum < 6 weeks                                   (1 Point)             [ ] BMI > 25 Kg/m2                                            (1 Point)                  [ ] Hormonal therapy  or oral contraception            (1 Point)                 [ ] Sepsis (in the previous month)                        (1 Point)                  [ ] History of pregnancy complications  [ ] Pneumonia or serious lung disease                                               [ ] Unexplained or recurrent                       (1 Point)           (in the previous month)                               (1 Point)  [ ] Abnormal pulmonary function test                     (1 Point)                 SURGERY RELATED RISK FACTORS  [ ] Acute myocardial infarction                              (1 Point)                 [ ]  Section                                            (1 Point)  [ ] Congestive heart failure (in the previous month)  (1 Point)                 [ ] Minor surgery                                                 (1 Point)   [ ] Inflammatory bowel disease                             (1 Point)                 [ ] Arthroscopic surgery                                        (2 Points)  [ ] Central venous access                                    (2 Points)                [ ] General surgery lasting more than 45 minutes   (2 Points)       [ ] Stroke (in the previous month)                          (5 Points)               [x ] Elective arthroplasty                                        (5 Points)                                                                                                                                               HEMATOLOGY RELATED FACTORS                                                 TRAUMA RELATED RISK FACTORS  [ ] Prior episodes of VTE                                     (3 Points)                 [ ] Fracture of the hip, pelvis, or leg                       (5 Points)  [ ] Positive family history for VTE                         (3 Points)                 [ ] Acute spinal cord injury (in the previous month)  (5 Points)  [ ] Prothrombin 35601 A                                      (3 Points)                 [ ] Paralysis  (less than 1 month)                          (5 Points)  [ ] Factor V Leiden                                             (3 Points)                 [ ] Multiple Trauma within 1 month                         (5 Points)  [ ] Lupus anticoagulants                                     (3 Points)                                                           [ ] Anticardiolipin antibodies                                (3 Points)                                                       [ ] High homocysteine in the blood                      (3 Points)                                             [ ] Other congenital or acquired thrombophilia       (3 Points)                                                [ ] Heparin induced thrombocytopenia                  (3 Points)                                          Total Score [     7     ]   Caprini Score 0-2: Low risk, No VTE Prophylaxis required for most patient's, encourage ambulation  Caprini Score 3-6: At Risk, Pharmacologic VTE prophylaxis is indicated for most patients ( in the absence of a contraindication)  Caprini Score Greater than or = 7: High Risk , pharmacologic VTE is indicated for most patients ( in the absence of a contraindication)    Caprini score indicates that the patient is high risk for VTE event ( score 6 or greater). Surgical patient's in this group will benefit from both pharmacologic prophylaxis and intermittent compression devices . Surgical team will determine the balance between VTE  risk and bleeding risk and other clinical considerations

## 2023-09-01 ENCOUNTER — NON-APPOINTMENT (OUTPATIENT)
Age: 68
End: 2023-09-01

## 2023-09-05 ENCOUNTER — TRANSCRIPTION ENCOUNTER (OUTPATIENT)
Age: 68
End: 2023-09-05

## 2023-09-05 RX ORDER — ONDANSETRON 8 MG/1
4 TABLET, FILM COATED ORAL EVERY 6 HOURS
Refills: 0 | Status: DISCONTINUED | OUTPATIENT
Start: 2023-09-06 | End: 2023-09-07

## 2023-09-05 RX ORDER — ASPIRIN/CALCIUM CARB/MAGNESIUM 324 MG
81 TABLET ORAL
Refills: 0 | Status: DISCONTINUED | OUTPATIENT
Start: 2023-09-07 | End: 2023-09-07

## 2023-09-05 RX ORDER — MAGNESIUM HYDROXIDE 400 MG/1
30 TABLET, CHEWABLE ORAL DAILY
Refills: 0 | Status: DISCONTINUED | OUTPATIENT
Start: 2023-09-06 | End: 2023-09-07

## 2023-09-05 RX ORDER — SODIUM CHLORIDE 9 MG/ML
1000 INJECTION, SOLUTION INTRAVENOUS
Refills: 0 | Status: DISCONTINUED | OUTPATIENT
Start: 2023-09-06 | End: 2023-09-07

## 2023-09-05 RX ORDER — CELECOXIB 200 MG/1
200 CAPSULE ORAL EVERY 12 HOURS
Refills: 0 | Status: DISCONTINUED | OUTPATIENT
Start: 2023-09-07 | End: 2023-09-07

## 2023-09-05 RX ORDER — SENNA PLUS 8.6 MG/1
2 TABLET ORAL AT BEDTIME
Refills: 0 | Status: DISCONTINUED | OUTPATIENT
Start: 2023-09-06 | End: 2023-09-07

## 2023-09-05 RX ORDER — HYDROMORPHONE HYDROCHLORIDE 2 MG/ML
0.5 INJECTION INTRAMUSCULAR; INTRAVENOUS; SUBCUTANEOUS
Refills: 0 | Status: DISCONTINUED | OUTPATIENT
Start: 2023-09-06 | End: 2023-09-07

## 2023-09-05 RX ORDER — POLYETHYLENE GLYCOL 3350 17 G/17G
17 POWDER, FOR SOLUTION ORAL AT BEDTIME
Refills: 0 | Status: DISCONTINUED | OUTPATIENT
Start: 2023-09-06 | End: 2023-09-07

## 2023-09-05 RX ORDER — OXYCODONE HYDROCHLORIDE 5 MG/1
10 TABLET ORAL
Refills: 0 | Status: DISCONTINUED | OUTPATIENT
Start: 2023-09-06 | End: 2023-09-06

## 2023-09-05 RX ORDER — ACETAMINOPHEN 500 MG
1000 TABLET ORAL EVERY 8 HOURS
Refills: 0 | Status: DISCONTINUED | OUTPATIENT
Start: 2023-09-06 | End: 2023-09-07

## 2023-09-05 RX ORDER — ATENOLOL 25 MG/1
25 TABLET ORAL DAILY
Refills: 0 | Status: DISCONTINUED | OUTPATIENT
Start: 2023-09-06 | End: 2023-09-07

## 2023-09-05 RX ORDER — LANOLIN ALCOHOL/MO/W.PET/CERES
3 CREAM (GRAM) TOPICAL AT BEDTIME
Refills: 0 | Status: DISCONTINUED | OUTPATIENT
Start: 2023-09-06 | End: 2023-09-07

## 2023-09-05 RX ORDER — ACETAMINOPHEN 500 MG
1000 TABLET ORAL ONCE
Refills: 0 | Status: COMPLETED | OUTPATIENT
Start: 2023-09-06 | End: 2023-09-06

## 2023-09-05 RX ORDER — PANTOPRAZOLE SODIUM 20 MG/1
40 TABLET, DELAYED RELEASE ORAL
Refills: 0 | Status: DISCONTINUED | OUTPATIENT
Start: 2023-09-06 | End: 2023-09-07

## 2023-09-05 RX ORDER — OXYCODONE HYDROCHLORIDE 5 MG/1
5 TABLET ORAL
Refills: 0 | Status: DISCONTINUED | OUTPATIENT
Start: 2023-09-06 | End: 2023-09-06

## 2023-09-05 RX ORDER — DEXAMETHASONE 0.5 MG/5ML
10 ELIXIR ORAL ONCE
Refills: 0 | Status: COMPLETED | OUTPATIENT
Start: 2023-09-07 | End: 2023-09-07

## 2023-09-05 RX ORDER — ASCORBIC ACID 60 MG
500 TABLET,CHEWABLE ORAL
Refills: 0 | Status: DISCONTINUED | OUTPATIENT
Start: 2023-09-06 | End: 2023-09-07

## 2023-09-06 ENCOUNTER — INPATIENT (INPATIENT)
Facility: HOSPITAL | Age: 68
LOS: 0 days | Discharge: HOME HEALTH | End: 2023-09-07
Attending: ORTHOPAEDIC SURGERY | Admitting: ORTHOPAEDIC SURGERY
Payer: COMMERCIAL

## 2023-09-06 ENCOUNTER — APPOINTMENT (OUTPATIENT)
Dept: ORTHOPEDIC SURGERY | Facility: HOSPITAL | Age: 68
End: 2023-09-06
Payer: COMMERCIAL

## 2023-09-06 ENCOUNTER — TRANSCRIPTION ENCOUNTER (OUTPATIENT)
Age: 68
End: 2023-09-06

## 2023-09-06 VITALS
SYSTOLIC BLOOD PRESSURE: 147 MMHG | RESPIRATION RATE: 15 BRPM | WEIGHT: 154.98 LBS | DIASTOLIC BLOOD PRESSURE: 86 MMHG | TEMPERATURE: 98 F | OXYGEN SATURATION: 98 % | HEART RATE: 65 BPM | HEIGHT: 62 IN

## 2023-09-06 DIAGNOSIS — Z98.51 TUBAL LIGATION STATUS: Chronic | ICD-10-CM

## 2023-09-06 DIAGNOSIS — Z98.890 OTHER SPECIFIED POSTPROCEDURAL STATES: Chronic | ICD-10-CM

## 2023-09-06 DIAGNOSIS — M17.11 UNILATERAL PRIMARY OSTEOARTHRITIS, RIGHT KNEE: ICD-10-CM

## 2023-09-06 LAB
ANION GAP SERPL CALC-SCNC: 5 MMOL/L — SIGNIFICANT CHANGE UP (ref 5–17)
ANION GAP SERPL CALC-SCNC: 6 MMOL/L — SIGNIFICANT CHANGE UP (ref 5–17)
BUN SERPL-MCNC: 16 MG/DL — SIGNIFICANT CHANGE UP (ref 7–23)
BUN SERPL-MCNC: 19 MG/DL — SIGNIFICANT CHANGE UP (ref 7–23)
CALCIUM SERPL-MCNC: 8.5 MG/DL — SIGNIFICANT CHANGE UP (ref 8.5–10.1)
CALCIUM SERPL-MCNC: 8.6 MG/DL — SIGNIFICANT CHANGE UP (ref 8.5–10.1)
CHLORIDE SERPL-SCNC: 110 MMOL/L — HIGH (ref 96–108)
CHLORIDE SERPL-SCNC: 114 MMOL/L — HIGH (ref 96–108)
CO2 SERPL-SCNC: 25 MMOL/L — SIGNIFICANT CHANGE UP (ref 22–31)
CO2 SERPL-SCNC: 29 MMOL/L — SIGNIFICANT CHANGE UP (ref 22–31)
CREAT SERPL-MCNC: 0.77 MG/DL — SIGNIFICANT CHANGE UP (ref 0.5–1.3)
CREAT SERPL-MCNC: 0.89 MG/DL — SIGNIFICANT CHANGE UP (ref 0.5–1.3)
EGFR: 71 ML/MIN/1.73M2 — SIGNIFICANT CHANGE UP
EGFR: 84 ML/MIN/1.73M2 — SIGNIFICANT CHANGE UP
GLUCOSE SERPL-MCNC: 105 MG/DL — HIGH (ref 70–99)
GLUCOSE SERPL-MCNC: 94 MG/DL — SIGNIFICANT CHANGE UP (ref 70–99)
HCT VFR BLD CALC: 36.3 % — SIGNIFICANT CHANGE UP (ref 34.5–45)
HGB BLD-MCNC: 12.1 G/DL — SIGNIFICANT CHANGE UP (ref 11.5–15.5)
MCHC RBC-ENTMCNC: 29.8 PG — SIGNIFICANT CHANGE UP (ref 27–34)
MCHC RBC-ENTMCNC: 33.3 G/DL — SIGNIFICANT CHANGE UP (ref 32–36)
MCV RBC AUTO: 89.4 FL — SIGNIFICANT CHANGE UP (ref 80–100)
NRBC # BLD: 0 /100 WBCS — SIGNIFICANT CHANGE UP (ref 0–0)
PLATELET # BLD AUTO: 175 K/UL — SIGNIFICANT CHANGE UP (ref 150–400)
POTASSIUM SERPL-MCNC: 3.7 MMOL/L — SIGNIFICANT CHANGE UP (ref 3.5–5.3)
POTASSIUM SERPL-MCNC: 4.1 MMOL/L — SIGNIFICANT CHANGE UP (ref 3.5–5.3)
POTASSIUM SERPL-SCNC: 3.7 MMOL/L — SIGNIFICANT CHANGE UP (ref 3.5–5.3)
POTASSIUM SERPL-SCNC: 4.1 MMOL/L — SIGNIFICANT CHANGE UP (ref 3.5–5.3)
RBC # BLD: 4.06 M/UL — SIGNIFICANT CHANGE UP (ref 3.8–5.2)
RBC # FLD: 13.3 % — SIGNIFICANT CHANGE UP (ref 10.3–14.5)
SODIUM SERPL-SCNC: 144 MMOL/L — SIGNIFICANT CHANGE UP (ref 135–145)
SODIUM SERPL-SCNC: 145 MMOL/L — SIGNIFICANT CHANGE UP (ref 135–145)
WBC # BLD: 4.92 K/UL — SIGNIFICANT CHANGE UP (ref 3.8–10.5)
WBC # FLD AUTO: 4.92 K/UL — SIGNIFICANT CHANGE UP (ref 3.8–10.5)

## 2023-09-06 PROCEDURE — 20610 DRAIN/INJ JOINT/BURSA W/O US: CPT | Mod: 59,RT

## 2023-09-06 PROCEDURE — 20985 CPTR-ASST DIR MS PX: CPT

## 2023-09-06 PROCEDURE — 73560 X-RAY EXAM OF KNEE 1 OR 2: CPT | Mod: 26,RT

## 2023-09-06 PROCEDURE — 27447 TOTAL KNEE ARTHROPLASTY: CPT | Mod: RT

## 2023-09-06 DEVICE — MAKO BONE PIN 4MM X 140MM: Type: IMPLANTABLE DEVICE | Site: RIGHT | Status: FUNCTIONAL

## 2023-09-06 DEVICE — COMP FEM CR CMNTLSS BEADED W/ PA SZ 2 RT: Type: IMPLANTABLE DEVICE | Site: RIGHT | Status: FUNCTIONAL

## 2023-09-06 DEVICE — BASEPLATE TIB TRIATHLON TRITAN SZ 2: Type: IMPLANTABLE DEVICE | Site: RIGHT | Status: FUNCTIONAL

## 2023-09-06 DEVICE — INSERT TIB BEARING CS X3 SZ 2 9MM: Type: IMPLANTABLE DEVICE | Site: RIGHT | Status: FUNCTIONAL

## 2023-09-06 DEVICE — MAKO BONE PIN 4MM X 110MM: Type: IMPLANTABLE DEVICE | Site: RIGHT | Status: FUNCTIONAL

## 2023-09-06 DEVICE — PATELLA TRIATHLON ASSYM SZ A3X10MM: Type: IMPLANTABLE DEVICE | Site: RIGHT | Status: FUNCTIONAL

## 2023-09-06 RX ORDER — CELECOXIB 200 MG/1
200 CAPSULE ORAL ONCE
Refills: 0 | Status: COMPLETED | OUTPATIENT
Start: 2023-09-06 | End: 2023-09-06

## 2023-09-06 RX ORDER — SODIUM CHLORIDE 9 MG/ML
1000 INJECTION, SOLUTION INTRAVENOUS
Refills: 0 | Status: DISCONTINUED | OUTPATIENT
Start: 2023-09-06 | End: 2023-09-06

## 2023-09-06 RX ORDER — TRAMADOL HYDROCHLORIDE 50 MG/1
50 TABLET ORAL EVERY 4 HOURS
Refills: 0 | Status: DISCONTINUED | OUTPATIENT
Start: 2023-09-06 | End: 2023-09-07

## 2023-09-06 RX ORDER — SODIUM CHLORIDE 9 MG/ML
3 INJECTION INTRAMUSCULAR; INTRAVENOUS; SUBCUTANEOUS EVERY 8 HOURS
Refills: 0 | Status: DISCONTINUED | OUTPATIENT
Start: 2023-09-06 | End: 2023-09-06

## 2023-09-06 RX ORDER — HYDROMORPHONE HYDROCHLORIDE 2 MG/ML
0.5 INJECTION INTRAMUSCULAR; INTRAVENOUS; SUBCUTANEOUS
Refills: 0 | Status: DISCONTINUED | OUTPATIENT
Start: 2023-09-06 | End: 2023-09-06

## 2023-09-06 RX ORDER — ONDANSETRON 8 MG/1
4 TABLET, FILM COATED ORAL ONCE
Refills: 0 | Status: DISCONTINUED | OUTPATIENT
Start: 2023-09-06 | End: 2023-09-06

## 2023-09-06 RX ORDER — ACETAMINOPHEN 500 MG
650 TABLET ORAL ONCE
Refills: 0 | Status: COMPLETED | OUTPATIENT
Start: 2023-09-06 | End: 2023-09-06

## 2023-09-06 RX ORDER — INFLUENZA VIRUS VACCINE 15; 15; 15; 15 UG/.5ML; UG/.5ML; UG/.5ML; UG/.5ML
0.7 SUSPENSION INTRAMUSCULAR ONCE
Refills: 0 | Status: COMPLETED | OUTPATIENT
Start: 2023-09-06 | End: 2023-09-06

## 2023-09-06 RX ORDER — OXYCODONE HYDROCHLORIDE 5 MG/1
5 TABLET ORAL EVERY 4 HOURS
Refills: 0 | Status: DISCONTINUED | OUTPATIENT
Start: 2023-09-06 | End: 2023-09-07

## 2023-09-06 RX ORDER — CEFAZOLIN SODIUM 1 G
2000 VIAL (EA) INJECTION EVERY 8 HOURS
Refills: 0 | Status: COMPLETED | OUTPATIENT
Start: 2023-09-06 | End: 2023-09-07

## 2023-09-06 RX ORDER — HYDROMORPHONE HYDROCHLORIDE 2 MG/ML
1 INJECTION INTRAMUSCULAR; INTRAVENOUS; SUBCUTANEOUS
Refills: 0 | Status: DISCONTINUED | OUTPATIENT
Start: 2023-09-06 | End: 2023-09-06

## 2023-09-06 RX ADMIN — Medication 400 MILLIGRAM(S): at 22:26

## 2023-09-06 RX ADMIN — Medication 100 MILLIGRAM(S): at 17:42

## 2023-09-06 RX ADMIN — Medication 1000 MILLIGRAM(S): at 22:55

## 2023-09-06 RX ADMIN — OXYCODONE HYDROCHLORIDE 5 MILLIGRAM(S): 5 TABLET ORAL at 17:10

## 2023-09-06 RX ADMIN — CELECOXIB 200 MILLIGRAM(S): 200 CAPSULE ORAL at 08:39

## 2023-09-06 RX ADMIN — Medication 650 MILLIGRAM(S): at 09:10

## 2023-09-06 RX ADMIN — CELECOXIB 200 MILLIGRAM(S): 200 CAPSULE ORAL at 09:10

## 2023-09-06 RX ADMIN — Medication 650 MILLIGRAM(S): at 08:39

## 2023-09-06 RX ADMIN — SODIUM CHLORIDE 125 MILLILITER(S): 9 INJECTION, SOLUTION INTRAVENOUS at 16:18

## 2023-09-06 RX ADMIN — Medication 500 MILLIGRAM(S): at 17:42

## 2023-09-06 RX ADMIN — OXYCODONE HYDROCHLORIDE 5 MILLIGRAM(S): 5 TABLET ORAL at 16:12

## 2023-09-06 RX ADMIN — SODIUM CHLORIDE 125 MILLILITER(S): 9 INJECTION, SOLUTION INTRAVENOUS at 16:11

## 2023-09-06 NOTE — PHYSICAL THERAPY INITIAL EVALUATION ADULT - PLANNED THERAPY INTERVENTIONS, PT EVAL
Pt will be able to negotiate 22 steps with right rail up facing the rail sideways independently in 1 week/balance training/bed mobility training/gait training/ROM/strengthening/transfer training

## 2023-09-06 NOTE — DISCHARGE NOTE PROVIDER - NSDCFUADDINST_GEN_ALL_CORE_FT
Keep knee straight while at rest. Elevate the leg as much as possible ("toes above the nose") to help control swelling. Make sure you get up and take a brief walk every two hours to help with circulation and prevent stiffness. Incentive spirometer 10X/hour. Cryocuff to help with pain/inflammation.   Keep GUME Dressing Clean, Dry and Intact. May shower with GUME Dressing. Please do not scrub, soak, peel or pick at the GUME dressing. No creams, lotions, or oils over dressing. May shower and let water run over dressing, no baths. Pat dry once out of shower. Dressing to be removed in 7 days. If dressing is saturated from border to border - may remove and replace with clean dry dressing.    Shower instructions for GUME Dressing: Place battery pack in a water sealed bag (such as a Ziplock bag) and keep battery out of the water.   Alternately you can turn battery pack off (press orange button.) Twist tubing OFF battery pack before entering shower. Once done with showering. Pat dressing dry. Reconnect tubing and twist ON battery pack after you are dry. Then turn battery pack on (press orange button.)

## 2023-09-06 NOTE — PHYSICAL THERAPY INITIAL EVALUATION ADULT - RANGE OF MOTION, PT EVAL
Pt will be able to improve ROM of  right knee in all planes to WFL  to improve independent in ADL, Gait in 4 weeks

## 2023-09-06 NOTE — PHYSICAL THERAPY INITIAL EVALUATION ADULT - ADDITIONAL COMMENTS
Preop: Pt lives with her son who works in a private home with 22 entry steps (B/L rails, far apart to reach both at the same time), no steps inside home.  Pt has a tub/shower combo with a fixed shower head, standard toilet seat height, & no grab bars. Pt states she is currently independent with all functional mobility including community ambulation with SAC. Pt states she is independent with ADL's as well. Pt reports daily 0/10 pain & states it is worse 7/10 with any activity. Pt is right hand dominant, wears eye glasses, and drives.  Pt reports she has the most difficulty time "getting up & moving around" after prolonged sitting. Pt endorses taking tylenol for pain management. Goal of therapy: manage pain & improve functional mobility.

## 2023-09-06 NOTE — DISCHARGE NOTE PROVIDER - NSDCFUADDAPPT_GEN_ALL_CORE_FT
Follow up with your surgeon in two weeks. Call for appointment.    If you need more pain medication, call your surgeon's office. For medication refills or authorizations, please call [phone]    We recommend that you call and schedule a follow up appointment with your primary care physician for repeat blood work (CBC and BMP) for post hospital discharge follow-up care 2-4 weeks after your surgery.     Make sure to have a bowel movement by 2 days after surgery. Take stool softeners and laxatives as needed.     Call your surgeon if you have increased redness/pain/drainage or fever. Return to ER for shortness of breath/calf tenderness.

## 2023-09-06 NOTE — OCCUPATIONAL THERAPY INITIAL EVALUATION ADULT - LIVES WITH, PROFILE
children; son who works, postop, pt stated her daughter is coming to assist her for 2 weeks/children

## 2023-09-06 NOTE — DISCHARGE NOTE PROVIDER - CARE PROVIDER_API CALL
Geovanni Calero Jayson  Orthopaedic Surgery  [address] [place] [id number]  Phone: [phone]  Fax: [phone]  Follow Up Time:

## 2023-09-06 NOTE — DISCHARGE NOTE PROVIDER - NSDCCPCAREPLAN_GEN_ALL_CORE_FT
PRINCIPAL DISCHARGE DIAGNOSIS  Diagnosis: Osteoarthritis of right knee  Assessment and Plan of Treatment:      Will help with GOC if needed.   Holistic nurse and chaplaincy referrals were place

## 2023-09-06 NOTE — OCCUPATIONAL THERAPY INITIAL EVALUATION ADULT - GENERAL OBSERVATIONS, REHAB EVAL
Pt encountered seated upright in recliner, NAD, all lines intact, pt reported pain 3/10 s/p Right TKA, pt agreeable to OT eval.

## 2023-09-06 NOTE — DISCHARGE NOTE PROVIDER - NSDCFUSCHEDAPPT_GEN_ALL_CORE_FT
Geovanni Calero  [facility] Physician [facility]  [address]  Scheduled Appointment: 09/21/2023

## 2023-09-06 NOTE — BRIEF OPERATIVE NOTE - NSICDXBRIEFPROCEDURE_GEN_ALL_CORE_FT
PROCEDURES:  Arthroplasty, knee, robot-assisted, using Fahad system 06-Sep-2023 12:26:30 Right Deep Arriola

## 2023-09-06 NOTE — OCCUPATIONAL THERAPY INITIAL EVALUATION ADULT - ADDITIONAL COMMENTS
Preop: Pt lives with her son who works in a private home with 22 entry steps (B/L rails, far apart to reach both at the same time), no steps inside home.  Pt has a tub/shower combo with a fixed shower head, standard toilet seat height, & no grab bars. Pt states she is currently independent with all functional mobility including community ambulation with SAC. Pt states she is independent with ADL's as well.

## 2023-09-06 NOTE — PROGRESS NOTE ADULT - SUBJECTIVE AND OBJECTIVE BOX
Patient is 68y y/o Female s/p R TKA POD#0  Patient is seen and examined at bedside.   Pt tolerated procedure well without any intra-op complications.    Pain is controlled.  Denies CP/SOB/Dizziness/N/V/D/HA.     Vital Signs Last 24 Hrs  T(C): 36.4 (06 Sep 2023 13:40), Max: 36.8 (06 Sep 2023 08:35)  T(F): 97.5 (06 Sep 2023 13:40), Max: 98.3 (06 Sep 2023 08:35)  HR: 57 (06 Sep 2023 13:40) (57 - 65)  BP: 147/82 (06 Sep 2023 13:40) (117/69 - 147/86)  BP(mean): --  RR: 14 (06 Sep 2023 13:40) (14 - 23)  SpO2: 98% (06 Sep 2023 13:40) (98% - 100%)    Parameters below as of 06 Sep 2023 13:40  Patient On (Oxygen Delivery Method): room air          PHYSICAL EXAM:  General: A&Ox3 NAD  RLE: Dressing C/D/I with ACE wrap in place. Motor intact + EHL/FHL/TA/GS.  Sensation is grossly intact.  Extremity warm, compartments soft, compressible. No calf tenderness. DP 2+   LLE: Motor intact +EHL/FHL/TA/GS. Sensation is grossly intact. Extremity warm, compartments soft, compressible. No calf tenderness. DP2+    Labs:                          12.1   4.92  )-----------( 175      ( 06 Sep 2023 12:40 )             36.3       09-06    144  |  114<H>  |  16  ----------------------------<  105<H>  4.1   |  25  |  0.77    Ca    8.5      06 Sep 2023 12:40        A/P: Patient is a 68y y/o Female s/p R TKA, POD # 0  -wound care, knee extension/leg elevation, cryocuff, isometric exercises, new medications reviewed with pt  -Pain control/analgesia  -Inc spirometry reviewed with pt, demonstrated competence  -DVT prophylaxis with Venodynes/Aspirin 81 BID  -F/U AM Labs  -PT/OT/WBAT  -prophylactic Antibiotic  -medical consult  -DC planning

## 2023-09-06 NOTE — DISCHARGE NOTE PROVIDER - NSDCMRMEDTOKEN_GEN_ALL_CORE_FT
anastrozole 1 mg oral tablet: 1 orally once a day  atenolol 25 mg oral tablet: 1 tab(s) orally once a day  hydroCHLOROthiazide 25 mg oral tablet: 1 tab(s) orally once a day  Multiple Vitamins oral tablet: 1 tab(s) orally once a day LD 2/9/2021  pantoprazole 40 mg oral delayed release tablet: 1 tab(s) orally once a day  Vitamin D3: 1 tab(s) orally once a day   acetaminophen 500 mg oral tablet: 2 tab(s) orally every 8 hours As needed Mild Pain (1 - 3)  anastrozole 1 mg oral tablet: 1 orally once a day  ascorbic acid 500 mg oral tablet: 1 tab(s) orally 2 times a day  Aspirin Enteric Coated 81 mg oral delayed release tablet: 1 tab(s) orally 2 times a day MDD: 2  atenolol 25 mg oral tablet: 1 tab(s) orally once a day  celecoxib 200 mg oral capsule: 1 cap(s) orally every 12 hours  hydroCHLOROthiazide 25 mg oral tablet: 1 tab(s) orally once a day  Multiple Vitamins oral tablet: 1 tab(s) orally once a day  Narcan 4 mg/0.1 mL nasal spray: 4 milligram(s) intranasally once , repeat as necessary.   As needed. For suspected opiate overdose   Follow instructions on packet MDD: 0.2 ml  oxyCODONE 5 mg oral tablet: 1 tab(s) orally every 4 hours as needed for  pain 1 tab for mild/moderate pain, 2 tabs for severe pain MDD: 6  pantoprazole 40 mg oral delayed release tablet: 1 tab(s) orally once a day (before a meal) MDD: 1  polyethylene glycol 3350 oral powder for reconstitution: 17 gram(s) orally once a day (at bedtime)  senna leaf extract oral tablet: 2 tab(s) orally once a day (at bedtime)

## 2023-09-06 NOTE — DISCHARGE NOTE PROVIDER - NSDCCPTREATMENT_GEN_ALL_CORE_FT
PRINCIPAL PROCEDURE  Procedure: Arthroplasty, knee, robot-assisted, using Fahad system  Findings and Treatment: Right

## 2023-09-06 NOTE — CONSULT NOTE ADULT - SUBJECTIVE AND OBJECTIVE BOX
ELEN ALSTON is a 68y Female s/p ROBOTIC ASSISTED RIGHT TOTAL KNEE ARTHROPLASTY WITH DARREL      w/ h/o HTN (hypertension)    GERD (gastroesophageal reflux disease)    Intraductal carcinoma in situ of right breast      denies any chest pain shortness of breath palpitation dizziness lightheadedness nausea vomiting fever or chills  Osteoarthritis of right knee      H/O arthroscopy of right knee     delivery delivered    H/O right breast biopsy    H/O tubal ligation      No pertinent family history in first degree relatives      SH: doesnot smoke or drink at this time    No Known Allergies    acetaminophen     Tablet .. 1000 milliGRAM(s) Oral every 8 hours PRN  ascorbic acid 500 milliGRAM(s) Oral two times a day  aspirin enteric coated 81 milliGRAM(s) Oral two times a day  atenolol  Tablet 25 milliGRAM(s) Oral daily  celecoxib 200 milliGRAM(s) Oral every 12 hours  hydrochlorothiazide 25 milliGRAM(s) Oral daily  HYDROmorphone  Injectable 0.5 milliGRAM(s) IV Push every 3 hours PRN  lactated ringers. 1000 milliLiter(s) IV Continuous <Continuous>  magnesium hydroxide Suspension 30 milliLiter(s) Oral daily PRN  melatonin 3 milliGRAM(s) Oral at bedtime PRN  multivitamin 1 Tablet(s) Oral daily  ondansetron Injectable 4 milliGRAM(s) IV Push every 6 hours PRN  oxyCODONE    IR 5 milliGRAM(s) Oral every 4 hours PRN  pantoprazole    Tablet 40 milliGRAM(s) Oral before breakfast  polyethylene glycol 3350 17 Gram(s) Oral at bedtime  senna 2 Tablet(s) Oral at bedtime  traMADol 50 milliGRAM(s) Oral every 4 hours PRN    T(C): 36.7 (23 @ 09:20), Max: 37 (23 @ 14:40)  HR: 62 (23 @ 09:20) (57 - 73)  BP: 109/63 (23 @ 09:20) (103/59 - 147/82)  RR: 18 (23 @ 09:20) (14 - 18)  SpO2: 97% (23 @ 09:20) (96% - 100%)  HEENT unremarkable  neck no JVD or bruit  heart normal S1 S2 RRR no gallops or rubs  chest clear to auscultation  abd sof nontender non distended +bs  ext no calf tenderness    A/P   DVT PX  pain control  bowel regimen   wound care as per ortho  GI PX  antiemetics prn  incentive spirometer

## 2023-09-07 ENCOUNTER — TRANSCRIPTION ENCOUNTER (OUTPATIENT)
Age: 68
End: 2023-09-07

## 2023-09-07 VITALS
HEART RATE: 63 BPM | OXYGEN SATURATION: 96 % | TEMPERATURE: 98 F | SYSTOLIC BLOOD PRESSURE: 141 MMHG | DIASTOLIC BLOOD PRESSURE: 73 MMHG | RESPIRATION RATE: 18 BRPM

## 2023-09-07 LAB
ANION GAP SERPL CALC-SCNC: 5 MMOL/L — SIGNIFICANT CHANGE UP (ref 5–17)
BUN SERPL-MCNC: 14 MG/DL — SIGNIFICANT CHANGE UP (ref 7–23)
CALCIUM SERPL-MCNC: 8.6 MG/DL — SIGNIFICANT CHANGE UP (ref 8.5–10.1)
CHLORIDE SERPL-SCNC: 108 MMOL/L — SIGNIFICANT CHANGE UP (ref 96–108)
CO2 SERPL-SCNC: 29 MMOL/L — SIGNIFICANT CHANGE UP (ref 22–31)
CREAT SERPL-MCNC: 0.69 MG/DL — SIGNIFICANT CHANGE UP (ref 0.5–1.3)
EGFR: 95 ML/MIN/1.73M2 — SIGNIFICANT CHANGE UP
GLUCOSE SERPL-MCNC: 126 MG/DL — HIGH (ref 70–99)
HCT VFR BLD CALC: 32.4 % — LOW (ref 34.5–45)
HGB BLD-MCNC: 10.4 G/DL — LOW (ref 11.5–15.5)
MCHC RBC-ENTMCNC: 28.5 PG — SIGNIFICANT CHANGE UP (ref 27–34)
MCHC RBC-ENTMCNC: 32.1 G/DL — SIGNIFICANT CHANGE UP (ref 32–36)
MCV RBC AUTO: 88.8 FL — SIGNIFICANT CHANGE UP (ref 80–100)
NRBC # BLD: 0 /100 WBCS — SIGNIFICANT CHANGE UP (ref 0–0)
PLATELET # BLD AUTO: 177 K/UL — SIGNIFICANT CHANGE UP (ref 150–400)
POTASSIUM SERPL-MCNC: 4 MMOL/L — SIGNIFICANT CHANGE UP (ref 3.5–5.3)
POTASSIUM SERPL-SCNC: 4 MMOL/L — SIGNIFICANT CHANGE UP (ref 3.5–5.3)
RBC # BLD: 3.65 M/UL — LOW (ref 3.8–5.2)
RBC # FLD: 13.1 % — SIGNIFICANT CHANGE UP (ref 10.3–14.5)
SODIUM SERPL-SCNC: 142 MMOL/L — SIGNIFICANT CHANGE UP (ref 135–145)
WBC # BLD: 11.68 K/UL — HIGH (ref 3.8–10.5)
WBC # FLD AUTO: 11.68 K/UL — HIGH (ref 3.8–10.5)

## 2023-09-07 RX ORDER — CELECOXIB 200 MG/1
1 CAPSULE ORAL
Qty: 60 | Refills: 0
Start: 2023-09-07 | End: 2023-10-06

## 2023-09-07 RX ORDER — HYDROCHLOROTHIAZIDE 25 MG
1 TABLET ORAL
Qty: 0 | Refills: 0 | DISCHARGE

## 2023-09-07 RX ORDER — ATENOLOL 25 MG/1
1 TABLET ORAL
Qty: 0 | Refills: 0 | DISCHARGE
Start: 2023-09-07

## 2023-09-07 RX ORDER — PANTOPRAZOLE SODIUM 20 MG/1
1 TABLET, DELAYED RELEASE ORAL
Qty: 30 | Refills: 0
Start: 2023-09-07 | End: 2023-10-06

## 2023-09-07 RX ORDER — PANTOPRAZOLE SODIUM 20 MG/1
1 TABLET, DELAYED RELEASE ORAL
Qty: 0 | Refills: 0 | DISCHARGE

## 2023-09-07 RX ORDER — ATENOLOL 25 MG/1
1 TABLET ORAL
Qty: 0 | Refills: 0 | DISCHARGE

## 2023-09-07 RX ORDER — CHOLECALCIFEROL (VITAMIN D3) 125 MCG
1 CAPSULE ORAL
Qty: 0 | Refills: 0 | DISCHARGE

## 2023-09-07 RX ORDER — POLYETHYLENE GLYCOL 3350 17 G/17G
17 POWDER, FOR SOLUTION ORAL
Qty: 0 | Refills: 0 | DISCHARGE
Start: 2023-09-07

## 2023-09-07 RX ORDER — ACETAMINOPHEN 500 MG
2 TABLET ORAL
Qty: 0 | Refills: 0 | DISCHARGE
Start: 2023-09-07

## 2023-09-07 RX ORDER — SENNA PLUS 8.6 MG/1
2 TABLET ORAL
Qty: 0 | Refills: 0 | DISCHARGE
Start: 2023-09-07

## 2023-09-07 RX ORDER — ASPIRIN/CALCIUM CARB/MAGNESIUM 324 MG
1 TABLET ORAL
Qty: 60 | Refills: 0
Start: 2023-09-07 | End: 2023-10-06

## 2023-09-07 RX ORDER — HYDROCHLOROTHIAZIDE 25 MG
1 TABLET ORAL
Qty: 0 | Refills: 0 | DISCHARGE
Start: 2023-09-07

## 2023-09-07 RX ORDER — OXYCODONE HYDROCHLORIDE 5 MG/1
1 TABLET ORAL
Qty: 42 | Refills: 0
Start: 2023-09-07 | End: 2023-09-13

## 2023-09-07 RX ORDER — KETOROLAC TROMETHAMINE 30 MG/ML
30 SYRINGE (ML) INJECTION ONCE
Refills: 0 | Status: DISCONTINUED | OUTPATIENT
Start: 2023-09-07 | End: 2023-09-07

## 2023-09-07 RX ORDER — ASCORBIC ACID 60 MG
1 TABLET,CHEWABLE ORAL
Qty: 0 | Refills: 0 | DISCHARGE
Start: 2023-09-07

## 2023-09-07 RX ORDER — NALOXONE HYDROCHLORIDE 4 MG/.1ML
4 SPRAY NASAL
Qty: 1 | Refills: 0
Start: 2023-09-07 | End: 2023-09-07

## 2023-09-07 RX ADMIN — SODIUM CHLORIDE 125 MILLILITER(S): 9 INJECTION, SOLUTION INTRAVENOUS at 06:15

## 2023-09-07 RX ADMIN — OXYCODONE HYDROCHLORIDE 5 MILLIGRAM(S): 5 TABLET ORAL at 07:16

## 2023-09-07 RX ADMIN — Medication 500 MILLIGRAM(S): at 17:16

## 2023-09-07 RX ADMIN — CELECOXIB 200 MILLIGRAM(S): 200 CAPSULE ORAL at 17:16

## 2023-09-07 RX ADMIN — Medication 30 MILLIGRAM(S): at 11:04

## 2023-09-07 RX ADMIN — CELECOXIB 200 MILLIGRAM(S): 200 CAPSULE ORAL at 06:15

## 2023-09-07 RX ADMIN — Medication 81 MILLIGRAM(S): at 17:16

## 2023-09-07 RX ADMIN — ATENOLOL 25 MILLIGRAM(S): 25 TABLET ORAL at 06:16

## 2023-09-07 RX ADMIN — Medication 1 TABLET(S): at 11:48

## 2023-09-07 RX ADMIN — OXYCODONE HYDROCHLORIDE 5 MILLIGRAM(S): 5 TABLET ORAL at 06:16

## 2023-09-07 RX ADMIN — PANTOPRAZOLE SODIUM 40 MILLIGRAM(S): 20 TABLET, DELAYED RELEASE ORAL at 06:16

## 2023-09-07 RX ADMIN — Medication 81 MILLIGRAM(S): at 06:16

## 2023-09-07 RX ADMIN — Medication 30 MILLIGRAM(S): at 10:33

## 2023-09-07 RX ADMIN — CELECOXIB 200 MILLIGRAM(S): 200 CAPSULE ORAL at 07:16

## 2023-09-07 RX ADMIN — Medication 102 MILLIGRAM(S): at 06:16

## 2023-09-07 RX ADMIN — Medication 100 MILLIGRAM(S): at 01:47

## 2023-09-07 RX ADMIN — Medication 500 MILLIGRAM(S): at 06:15

## 2023-09-07 NOTE — DISCHARGE NOTE NURSING/CASE MANAGEMENT/SOCIAL WORK - NSDCPEFALRISK_GEN_ALL_CORE
For information on Fall & Injury Prevention, visit: [email or web address] OR  [email or web address] OR  https://www.cdc.gov/steadi/patient.html

## 2023-09-07 NOTE — PROGRESS NOTE ADULT - SUBJECTIVE AND OBJECTIVE BOX
Patient is seen and examined at bedside. Denies CP/SOB/Dizziness/N/V/D/HA. Pain is controlled.     Vital Signs Last 24 Hrs  T(C): 36.7 (07 Sep 2023 09:20), Max: 37 (06 Sep 2023 14:40)  T(F): 98 (07 Sep 2023 09:20), Max: 98.6 (06 Sep 2023 14:40)  HR: 62 (07 Sep 2023 09:20) (57 - 73)  BP: 109/63 (07 Sep 2023 09:20) (103/59 - 147/82)  BP(mean): --  RR: 18 (07 Sep 2023 09:20) (14 - 18)  SpO2: 97% (07 Sep 2023 09:20) (96% - 99%)    Parameters below as of 07 Sep 2023 09:20  Patient On (Oxygen Delivery Method): room air          PHYSICAL EXAM:  General: NAD  Neuro:  Alert & responsive  HEENT: NCAT, EOMI, conjunctiva clear  abd: soft, NT/ND  Right LE: GUME dressing C/D/I. Battery flashing green/ok.  Motor intact + EHL/FHL/TA/GS.  Sensation is grossly intact.  Extremity warm, compartments soft, compressible. No calf tenderness. DP 2+   Left LE: Motor intact +EHL/FHL/TA/GS. Sensation is grossly intact. Extremity warm, compartments soft, compressible. No calf tenderness. DP2+    Labs:                          10.4   11.68 )-----------( 177      ( 07 Sep 2023 05:58 )             32.4       09-07    142  |  108  |  14  ----------------------------<  126<H>  4.0   |  29  |  0.69    Ca    8.6      07 Sep 2023 05:58        A/P: Patient is a 68y y/o Female s/p right TKA , POD # 1  -wound care, knee extension/leg elevation, cryocuff, isometric exercises, new medications reviewed with pt  -Pain control/analgesia reviewed   -Inc spirometry reviewed with pt, demonstrated competence  -DVT prophylaxis with Venodynes/Aspirin 81mg BID  -PT/OT/WBAT  -medical consult reviewed   -DC planning: for home today with home care  -D/W DR Calero

## 2023-09-07 NOTE — DISCHARGE NOTE NURSING/CASE MANAGEMENT/SOCIAL WORK - NSSCCARECORD_GEN_ALL_CORE
"Hospital Medicine  History and Physical Exam    Team: Networked reference to record PCT  Antonio Cummins MD  Admit Date: 5/22/2020  Principal Problem:  Optic neuritis   Patient information was obtained from patient, past medical records and ER records.   Primary care Physician: Zarina Coffman MD  Code status: Full Code    HPI: 34 yo F with PMHx morbid obesity, HTN, HLD, and prior episode of optic neuritis in 2013 treated with steroids presented to [facility] ED yesterday with 3 day history of decreased vision and blurry vision to right eye. The patient reports that since 2013 she has had issues with the eye "on and off". She states that she occasionally has blurred vision in the eye that lasts a few hours but will eventually return to normal. On the Tuesday prior to admit, she reports developing blurred vision and some pressure like pain in her R eye. She reports that the pain went away after some time, but the blurred vision has persisted and gotten worse over the last few days. She has never had it last this long before. She denies any pain with movement with the R eye. She also denies any issues with her L eye, focal weakness, numbness, fevers, chills, or headaches.    Hospital Course per :   "Ophthalmology was called last night and recommended MRI of orbits due to her prior history of optic neuritis but could not be done until this am as had to go to [facility] for MRI as too large for [facility]. MRI of orbits with and without contrast showed asymmetric increased signal within the bilateral optic nerves, right greater than left, with some enhancement identified within the right optic nerve on the postcontrast images, these images somewhat limited given significant motion on the examination.  Additionally, there are 3 foci of T2/FLAIR signal abnormality in the supratentorial white matter, primarily in a periventricular distribution, with the region of signal abnormality adjacent to the posterior horn " "of the left lateral ventricle demonstrating lesional enhancement.  Findings are concerning for a demyelinating disease process, such as multiple sclerosis with bilateral optic neuritis (right greater than left), with active demyelination involving the lesion adjacent to the posterior horn of the left lateral ventricle. Patient seen by Ophthalmology this am and recommended Solumedrol 1 gram IV daily for 3 days and labs for TOM, ACE, lysozyme, FTA-ABS, ESR/CRP, QuantiFERON gold, aq4 IgG. Labs sent and are pending and patient given dose of IV Solumedrol in Ed. Ophthalmology recommending Neuro consult for possible MS and no neurology at [facility]. Dr. Villa from [facility] called and stated patient needed LP and panel for MS sent of CSF and neuro evaluation for which they could do tele Neuro consult but Hospital medicine uncomfortable about admitting patient so patient to be transferred for LP and Neurology consult and evaluation."    Hemoglobin A1C   Date Value Ref Range Status   2020 5.4 4.0 - 5.6 % Final     Comment:     ADA Screening Guidelines:  5.7-6.4%  Consistent with prediabetes  >or=6.5%  Consistent with diabetes  High levels of fetal hemoglobin interfere with the HbA1C  assay. Heterozygous hemoglobin variants (HbS, HgC, etc)do  not significantly interfere with this assay.   However, presence of multiple variants may affect accuracy.         Past Medical History: Patient has a past medical history of Dysmenorrhea, Essential hypertension (2020), Excessive menses, Excessive or frequent menstruation (2018), HLD (hyperlipidemia), Morbid obesity with BMI of 50.0-59.9, adult (2020), and Wears glasses.    Past Surgical History: Patient has a past surgical history that includes  section; Abdominal surgery; Hernia repair; Esophagogastroduodenoscopy; and Tonsillectomy.    Social History: Patient reports that she has never smoked. She has never used smokeless tobacco. She reports that she " does not drink alcohol or use drugs.    Family History: family history includes COPD in her father; Hypertension in her father and mother.    Medications: reviewed     Allergies: Patient has No Known Allergies.    ROS  Pain Scale: 0 /10   Constitutional: no fever or chills  Respiratory: no cough or shortness of breath  Cardiovascular: no chest pain or palpitations  Gastrointestinal: no nausea or vomiting, no abdominal pain or change in bowel habits  Genitourinary: no hematuria or dysuria  Integument/Breast: no rash or pruritis  Hematologic/Lymphatic: no easy bruising or lymphadenopathy  Musculoskeletal: no arthralgias or myalgias  Neurological: Positive for blurred vision  Behavioral/Psych: no depression or anxiety    PEx  Temp:  [97.7 °F (36.5 °C)-99.6 °F (37.6 °C)]   Pulse:  [60-98]   Resp:  [15-18]   BP: (120-163)/(58-85)   SpO2:  [98 %-100 %]   There is no height or weight on file to calculate BMI.   No intake or output data in the 24 hours ending 05/22/20 2032    General appearance: no distress,  Morbidly obese pt. Resting comfortably  Mental status: Alert and oriented x 3  HEENT:  conjunctivae/corneas clear, PERRL  Neck: supple, thyroid not enlarged  Pulm:   normal respiratory effort, CTA B, no c/w/r  Card: RRR, S1, S2 normal, no murmur, click, rub or gallop  Abd: soft, NT, ND, BS present; no masses, no organomegaly  Ext: no c/c/e  Pulses: 2+, symmetric  Skin: color, texture, turgor normal. No rashes or lesions  Neuro: CN II-XII grossly intact, no focal numbness or weakness, normal strength and tone, Notacibly decreased visual acuity in R eye, pt. Only able to make out shapes     Recent Results (from the past 24 hour(s))   COVID-19 Rapid Screening    Collection Time: 05/21/20  9:48 PM   Result Value Ref Range    SARS-CoV-2 RNA, Amplification, Qual Negative Negative   Sedimentation rate    Collection Time: 05/22/20  1:54 PM   Result Value Ref Range    Sed Rate 32 (H) 0 - 20 mm/Hr   C-reactive protein     Collection Time: 05/22/20  1:54 PM   Result Value Ref Range    CRP 6.6 0.0 - 8.2 mg/L       No results for input(s): POCTGLUCOSE in the last 168 hours.    Active Hospital Problems    Diagnosis  POA    *Bilateral optic neuritis [H46.9]  Yes    HLD (hyperlipidemia) [E78.5]  Yes    Morbid obesity with BMI of 50.0-59.9, adult [E66.01, Z68.43]  Not Applicable    Essential hypertension [I10]  Yes      Resolved Hospital Problems   No resolved problems to display.     Assessment and Plan:  Optic Neuritis  -Pt. With B/L optic neuritis R>L, started on course of IV solumedrol 1g daily x 3 days per opthalmology rec's  -MRI orbits notable for 3 foci of T2/FLAIR signal abnormality in the supratentorial white matter concerning for demyelinating disease process, such as multiple sclerosis with bilateral optic neuritis  -TOM, Angiotensin converting enzyme, FTA, NMO aquaporin-4-IGG testing sent  -Neurology consulted for further recommednations and potential LP    HTN  -Continue home med lisinopril. Pt. Reports being on another med but unsure of name. Also unsure of lisinopril dose, will start on 20 mg listed in med rec and titrate as needed    DVT PPx: Moriah Cummins MD  Hospital Medicine Staff  [phone] pager     [facility]

## 2023-09-07 NOTE — DISCHARGE NOTE NURSING/CASE MANAGEMENT/SOCIAL WORK - NSDCFUADDAPPT_GEN_ALL_CORE_FT
Follow up with your surgeon in two weeks. Call for appointment.    If you need more pain medication, call your surgeon's office. For medication refills or authorizations, please call [phone]    We recommend that you call and schedule a follow up appointment with your primary care physician for repeat blood work (CBC and BMP) for post hospital discharge follow-up care 2-4 weeks after your surgery.     Make sure to have a bowel movement by 2 days after surgery. Take stool softeners and laxatives as needed.     Call your surgeon if you have increased redness/pain/drainage or fever. Return to ER for shortness of breath/calf tenderness.

## 2023-09-07 NOTE — DISCHARGE NOTE NURSING/CASE MANAGEMENT/SOCIAL WORK - PATIENT PORTAL LINK FT
You can access the FollowMyHealth Patient Portal offered by [facility] by registering at the following website: [email or web address]. By joining [facility]’s FollowMyHealth portal, you will also be able to view your health information using other applications (apps) compatible with our system.

## 2023-09-07 NOTE — PROGRESS NOTE ADULT - SUBJECTIVE AND OBJECTIVE BOX
ELEN ALSTON is a 68y Female s/p ROBOTIC ASSISTED RIGHT TOTAL KNEE ARTHROPLASTY WITH DARREL        denies any chest pain shortness of breath palpitation dizziness lightheadedness nausea vomiting fever or chills    T(C): 36.7 (09-07-23 @ 09:20), Max: 37 (09-06-23 @ 14:40)  HR: 62 (09-07-23 @ 09:20) (57 - 73)  BP: 109/63 (09-07-23 @ 09:20) (103/59 - 147/82)  RR: 18 (09-07-23 @ 09:20) (14 - 18)  SpO2: 97% (09-07-23 @ 09:20) (96% - 99%)  no jvd/bruit  s1 s2 rrr  cta  s/nt/nd  no calf tend                        10.4   11.68 )-----------( 177      ( 07 Sep 2023 05:58 )             32.4   09-07    142  |  108  |  14  ----------------------------<  126<H>  4.0   |  29  |  0.69    Ca    8.6      07 Sep 2023 05:58        cont dvt px  pain control  bowel regimen  antiemetics  incentive spirometer

## 2023-09-08 ENCOUNTER — APPOINTMENT (OUTPATIENT)
Dept: ORTHOPEDIC SURGERY | Facility: CLINIC | Age: 68
End: 2023-09-08

## 2023-09-10 ENCOUNTER — TRANSCRIPTION ENCOUNTER (OUTPATIENT)
Age: 68
End: 2023-09-10

## 2023-09-13 ENCOUNTER — TRANSCRIPTION ENCOUNTER (OUTPATIENT)
Age: 68
End: 2023-09-13

## 2023-09-14 DIAGNOSIS — M25.561 PAIN IN RIGHT KNEE: ICD-10-CM

## 2023-09-14 DIAGNOSIS — Z85.3 PERSONAL HISTORY OF MALIGNANT NEOPLASM OF BREAST: ICD-10-CM

## 2023-09-14 DIAGNOSIS — K21.9 GASTRO-ESOPHAGEAL REFLUX DISEASE WITHOUT ESOPHAGITIS: ICD-10-CM

## 2023-09-14 DIAGNOSIS — I10 ESSENTIAL (PRIMARY) HYPERTENSION: ICD-10-CM

## 2023-09-14 DIAGNOSIS — M17.11 UNILATERAL PRIMARY OSTEOARTHRITIS, RIGHT KNEE: ICD-10-CM

## 2023-09-20 ENCOUNTER — TRANSCRIPTION ENCOUNTER (OUTPATIENT)
Age: 68
End: 2023-09-20

## 2023-09-21 ENCOUNTER — APPOINTMENT (OUTPATIENT)
Dept: ORTHOPEDIC SURGERY | Facility: CLINIC | Age: 68
End: 2023-09-21
Payer: COMMERCIAL

## 2023-09-21 PROCEDURE — 99024 POSTOP FOLLOW-UP VISIT: CPT

## 2023-09-21 PROCEDURE — 73562 X-RAY EXAM OF KNEE 3: CPT | Mod: RT

## 2023-09-22 ENCOUNTER — TRANSCRIPTION ENCOUNTER (OUTPATIENT)
Age: 68
End: 2023-09-22

## 2023-09-24 ENCOUNTER — TRANSCRIPTION ENCOUNTER (OUTPATIENT)
Age: 68
End: 2023-09-24

## 2023-09-29 ENCOUNTER — TRANSCRIPTION ENCOUNTER (OUTPATIENT)
Age: 68
End: 2023-09-29

## 2023-10-24 ENCOUNTER — OUTPATIENT (OUTPATIENT)
Dept: OUTPATIENT SERVICES | Facility: HOSPITAL | Age: 68
LOS: 1 days | End: 2023-10-24
Payer: COMMERCIAL

## 2023-10-24 DIAGNOSIS — M25.561 PAIN IN RIGHT KNEE: ICD-10-CM

## 2023-10-24 DIAGNOSIS — Z98.51 TUBAL LIGATION STATUS: Chronic | ICD-10-CM

## 2023-10-24 DIAGNOSIS — Z98.890 OTHER SPECIFIED POSTPROCEDURAL STATES: Chronic | ICD-10-CM

## 2023-10-24 DIAGNOSIS — Z00.8 ENCOUNTER FOR OTHER GENERAL EXAMINATION: ICD-10-CM

## 2023-10-24 PROCEDURE — 93970 EXTREMITY STUDY: CPT | Mod: 26

## 2023-10-24 PROCEDURE — 93970 EXTREMITY STUDY: CPT

## 2023-10-25 DIAGNOSIS — M25.561 PAIN IN RIGHT KNEE: ICD-10-CM

## 2023-10-26 ENCOUNTER — APPOINTMENT (OUTPATIENT)
Dept: ORTHOPEDIC SURGERY | Facility: CLINIC | Age: 68
End: 2023-10-26
Payer: COMMERCIAL

## 2023-10-26 VITALS — BODY MASS INDEX: 27.97 KG/M2 | HEIGHT: 62 IN | WEIGHT: 152 LBS

## 2023-10-26 DIAGNOSIS — M24.669 ANKYLOSIS, UNSPECIFIED KNEE: ICD-10-CM

## 2023-10-26 PROCEDURE — 99024 POSTOP FOLLOW-UP VISIT: CPT

## 2023-11-07 ENCOUNTER — TRANSCRIPTION ENCOUNTER (OUTPATIENT)
Age: 68
End: 2023-11-07

## 2023-11-08 ENCOUNTER — OUTPATIENT (OUTPATIENT)
Dept: OUTPATIENT SERVICES | Facility: HOSPITAL | Age: 68
LOS: 1 days | Discharge: HOME | End: 2023-11-08

## 2023-11-08 ENCOUNTER — APPOINTMENT (OUTPATIENT)
Dept: ORTHOPEDIC SURGERY | Facility: HOSPITAL | Age: 68
End: 2023-11-08
Payer: COMMERCIAL

## 2023-11-08 ENCOUNTER — TRANSCRIPTION ENCOUNTER (OUTPATIENT)
Age: 68
End: 2023-11-08

## 2023-11-08 VITALS
OXYGEN SATURATION: 99 % | SYSTOLIC BLOOD PRESSURE: 146 MMHG | HEIGHT: 62 IN | DIASTOLIC BLOOD PRESSURE: 72 MMHG | HEART RATE: 55 BPM | WEIGHT: 151.9 LBS | TEMPERATURE: 98 F | RESPIRATION RATE: 14 BRPM

## 2023-11-08 VITALS
TEMPERATURE: 98 F | SYSTOLIC BLOOD PRESSURE: 160 MMHG | RESPIRATION RATE: 16 BRPM | OXYGEN SATURATION: 100 % | DIASTOLIC BLOOD PRESSURE: 74 MMHG | HEART RATE: 61 BPM

## 2023-11-08 DIAGNOSIS — Z98.51 TUBAL LIGATION STATUS: Chronic | ICD-10-CM

## 2023-11-08 DIAGNOSIS — Z98.890 OTHER SPECIFIED POSTPROCEDURAL STATES: Chronic | ICD-10-CM

## 2023-11-08 PROCEDURE — 27570 FIXATION OF KNEE JOINT: CPT | Mod: 78,RT

## 2023-11-08 PROCEDURE — 20610 DRAIN/INJ JOINT/BURSA W/O US: CPT | Mod: 78,59,RT

## 2023-11-08 RX ORDER — SODIUM CHLORIDE 9 MG/ML
1000 INJECTION, SOLUTION INTRAVENOUS
Refills: 0 | Status: DISCONTINUED | OUTPATIENT
Start: 2023-11-08 | End: 2023-11-08

## 2023-11-08 RX ORDER — FENTANYL CITRATE 50 UG/ML
50 INJECTION INTRAVENOUS
Refills: 0 | Status: DISCONTINUED | OUTPATIENT
Start: 2023-11-08 | End: 2023-11-08

## 2023-11-08 RX ORDER — SODIUM CHLORIDE 9 MG/ML
3 INJECTION INTRAMUSCULAR; INTRAVENOUS; SUBCUTANEOUS EVERY 8 HOURS
Refills: 0 | Status: DISCONTINUED | OUTPATIENT
Start: 2023-11-08 | End: 2023-11-08

## 2023-11-08 RX ORDER — ANASTROZOLE 1 MG/1
1 TABLET ORAL
Refills: 0 | DISCHARGE

## 2023-11-08 RX ORDER — ONDANSETRON 8 MG/1
4 TABLET, FILM COATED ORAL ONCE
Refills: 0 | Status: DISCONTINUED | OUTPATIENT
Start: 2023-11-08 | End: 2023-11-08

## 2023-11-08 RX ORDER — FENTANYL CITRATE 50 UG/ML
25 INJECTION INTRAVENOUS
Refills: 0 | Status: DISCONTINUED | OUTPATIENT
Start: 2023-11-08 | End: 2023-11-08

## 2023-11-08 RX ADMIN — SODIUM CHLORIDE 75 MILLILITER(S): 9 INJECTION, SOLUTION INTRAVENOUS at 08:14

## 2023-11-08 RX ADMIN — FENTANYL CITRATE 50 MICROGRAM(S): 50 INJECTION INTRAVENOUS at 08:12

## 2023-11-08 RX ADMIN — FENTANYL CITRATE 50 MICROGRAM(S): 50 INJECTION INTRAVENOUS at 08:40

## 2023-11-08 NOTE — ASU DISCHARGE PLAN (ADULT/PEDIATRIC) - ASU DC REMOVE DRESSINGFT
Problem: Potential for Falls  Goal: Patient will remain free of falls  INTERVENTIONS:  - Assess patient frequently for physical needs  -  Identify cognitive and physical deficits and behaviors that affect risk of falls  -  [place] fall precautions as indicated by assessment   - Educate patient/family on patient safety including physical limitations  - Instruct patient to call for assistance with activity based on assessment  - Modify environment to reduce risk of injury  - Consider OT/PT consult to assist with strengthening/mobility   Outcome: Progressing      Problem: Nutrition/Hydration-ADULT  Goal: Nutrient/Hydration intake appropriate for improving, restoring or maintaining nutritional needs  Monitor and assess patient's nutrition/hydration status for malnutrition (ex- brittle hair, bruises, dry skin, pale skin and conjunctiva, muscle wasting, smooth red tongue, and disorientation)  Collaborate with interdisciplinary team and initiate plan and interventions as ordered  Monitor patient's weight and dietary intake as ordered or per policy  Utilize nutrition screening tool and intervene per policy  Determine patient's food preferences and provide high-protein, high-caloric foods as appropriate       INTERVENTIONS:  - Monitor oral intake, urinary output, labs, and treatment plans  - Assess nutrition and hydration status and recommend course of action  - Evaluate amount of meals eaten  - Assist patient with eating if necessary   - Allow adequate time for meals  - Recommend/ encourage appropriate diets, oral nutritional supplements, and vitamin/mineral supplements  - Order, calculate, and assess calorie counts as needed  - Recommend, monitor, and adjust tube feedings and TPN/PPN based on assessed needs  - Assess need for intravenous fluids  - Provide specific nutrition/hydration education as appropriate  - Include patient/family/caregiver in decisions related to nutrition   Outcome: Progressing      Problem: PAIN - ADULT  Goal: Verbalizes/displays adequate comfort level or baseline comfort level  Interventions:  - Encourage patient to monitor pain and request assistance  - Assess pain using appropriate pain scale  - Administer analgesics based on type and severity of pain and evaluate response  - Implement non-pharmacological measures as appropriate and evaluate response  - Consider cultural and social influences on pain and pain management  - Notify physician/advanced practitioner if interventions unsuccessful or patient reports new pain   Outcome: Progressing      Problem: INFECTION - ADULT  Goal: Absence or prevention of progression during hospitalization  INTERVENTIONS:  - Assess and monitor for signs and symptoms of infection  - Monitor lab/diagnostic results  - Monitor all insertion sites, i e  indwelling lines, tubes, and drains  - Monitor endotracheal (as able) and nasal secretions for changes in amount and color  - [place] appropriate cooling/warming therapies per order  - Administer medications as ordered  - Instruct and encourage patient and family to use good hand hygiene technique  - Identify and instruct in appropriate isolation precautions for identified infection/condition   Outcome: Progressing    Goal: Absence of fever/infection during neutropenic period  INTERVENTIONS:  - Monitor WBC  - Implement neutropenic guidelines   Outcome: Progressing      Problem: SAFETY ADULT  Goal: Maintain or return to baseline ADL function  INTERVENTIONS:  -  Assess patient's ability to carry out ADLs; assess patient's baseline for ADL function and identify physical deficits which impact ability to perform ADLs (bathing, care of mouth/teeth, toileting, grooming, dressing, etc )  - Assess/evaluate cause of self-care deficits   - Assess range of motion  - Assess patient's mobility; develop plan if impaired  - Assess patient's need for assistive devices and provide as appropriate  - Encourage maximum independence but intervene and 24 supervise when necessary  ¯ Involve family in performance of ADLs  ¯ Assess for home care needs following discharge   ¯ Request OT consult to assist with ADL evaluation and planning for discharge  ¯ Provide patient education as appropriate   Outcome: Progressing    Goal: Maintain or return mobility status to optimal level  INTERVENTIONS:  - Assess patient's baseline mobility status (ambulation, transfers, stairs, etc )    - Identify cognitive and physical deficits and behaviors that affect mobility  - Identify mobility aids required to assist with transfers and/or ambulation (gait belt, sit-to-stand, lift, walker, cane, etc )  - [place] fall precautions as indicated by assessment  - Record patient progress and toleration of activity level on Mobility SBAR; progress patient to next Phase/Stage  - Instruct patient to call for assistance with activity based on assessment  - Request Rehabilitation consult to assist with strengthening/weightbearing, etc    Outcome: Progressing      Problem: DISCHARGE PLANNING  Goal: Discharge to home or other facility with appropriate resources  INTERVENTIONS:  - Identify barriers to discharge w/patient and caregiver  - Arrange for needed discharge resources and transportation as appropriate  - Identify discharge learning needs (meds, wound care, etc )  - Arrange for interpretive services to assist at discharge as needed  - Refer to Case Management Department for coordinating discharge planning if the patient needs post-hospital services based on physician/advanced practitioner order or complex needs related to functional status, cognitive ability, or social support system   Outcome: Progressing

## 2023-11-08 NOTE — ASU DISCHARGE PLAN (ADULT/PEDIATRIC) - CARE PROVIDER_API CALL
Geovanni Calero Jayson  Orthopaedic Surgery  [address] [place] [id number]  Phone: [phone]  Fax: [phone]  Follow Up Time:

## 2023-11-08 NOTE — ASU DISCHARGE PLAN (ADULT/PEDIATRIC) - NS MD DC FALL RISK RISK
For information on Fall & Injury Prevention, visit: [email or web address] OR  [email or web address] OR  https://www.cdc.gov/steadi/patient.html

## 2023-11-08 NOTE — H&P ADULT - NSHPPHYSICALEXAM_GEN_ALL_CORE
WD WN female in NAD  PERRL  Lungs CTA B/L  Heart: S1 S2 RRR  abd: soft NT ND  Neuro intact  RLE: Right knee incision healing well, NVI,

## 2023-11-08 NOTE — ASU PREOP CHECKLIST - 2.
What type of discharge? Inpatient  Risk of Hospital admission or ED visit: 7%  Is a TCM episode required? No  When should the patient follow up with PCP? 14 days of discharge.    Romina Holden RN  [facility]   High risk for falls

## 2023-11-14 DIAGNOSIS — M24.661 ANKYLOSIS, RIGHT KNEE: ICD-10-CM

## 2023-11-14 DIAGNOSIS — Z79.82 LONG TERM (CURRENT) USE OF ASPIRIN: ICD-10-CM

## 2023-11-14 DIAGNOSIS — K21.9 GASTRO-ESOPHAGEAL REFLUX DISEASE WITHOUT ESOPHAGITIS: ICD-10-CM

## 2023-11-14 DIAGNOSIS — Z96.651 PRESENCE OF RIGHT ARTIFICIAL KNEE JOINT: ICD-10-CM

## 2023-11-14 DIAGNOSIS — I10 ESSENTIAL (PRIMARY) HYPERTENSION: ICD-10-CM

## 2023-11-16 ENCOUNTER — APPOINTMENT (OUTPATIENT)
Dept: ORTHOPEDIC SURGERY | Facility: CLINIC | Age: 68
End: 2023-11-16
Payer: COMMERCIAL

## 2023-11-16 VITALS — WEIGHT: 152 LBS | HEIGHT: 62 IN | BODY MASS INDEX: 27.97 KG/M2

## 2023-11-16 PROCEDURE — 99024 POSTOP FOLLOW-UP VISIT: CPT

## 2023-11-29 ENCOUNTER — APPOINTMENT (OUTPATIENT)
Dept: SURGERY | Facility: CLINIC | Age: 68
End: 2023-11-29

## 2023-12-10 ENCOUNTER — NON-APPOINTMENT (OUTPATIENT)
Age: 68
End: 2023-12-10

## 2023-12-28 ENCOUNTER — NON-APPOINTMENT (OUTPATIENT)
Age: 68
End: 2023-12-28

## 2023-12-28 ENCOUNTER — APPOINTMENT (OUTPATIENT)
Dept: ORTHOPEDIC SURGERY | Facility: CLINIC | Age: 68
End: 2023-12-28
Payer: COMMERCIAL

## 2023-12-28 DIAGNOSIS — M24.661 ANKYLOSIS, RIGHT KNEE: ICD-10-CM

## 2023-12-28 DIAGNOSIS — Z96.651 PRESENCE OF RIGHT ARTIFICIAL KNEE JOINT: ICD-10-CM

## 2023-12-28 PROCEDURE — 73562 X-RAY EXAM OF KNEE 3: CPT | Mod: RT

## 2023-12-28 PROCEDURE — 99213 OFFICE O/P EST LOW 20 MIN: CPT

## 2023-12-28 NOTE — IMAGING
[Right] : right knee [AP] : anteroposterior [Lateral] : lateral [[place]] : skyline [Components well fixed, in good position] : Components well fixed, in good position

## 2023-12-28 NOTE — ASSESSMENT
[FreeTextEntry1] : S/P RT TKA 9/6/23: NO F/C/S. DOING WELL. HAD LIMITED ROM (0-40) THEN HAD JHONNY ON 11/8/23. DURING JHONNY WAS ABLE TO FLEX . NOW WITH 95 DEGREES OF FLEXION. NO PAIN, JUST LIMITED MOTION. XRAYS REVIEWED WITH COMPONENTS WELL FIXED. NO SIGNS OF INFECTION. GOOD LIGAMENT BALANCE ON EXAM. ABX AND PRECAUTIONS AGAIN REVIEWED. PT RX. QUESTIONS ANSWERED. WILL RTW FD 1/2/24.

## 2023-12-28 NOTE — HISTORY OF PRESENT ILLNESS
[Right Leg] : right leg [Gradual] : gradual [2] : 2 [1] : 2 [Dull/Aching] : dull/aching [Localized] : localized [Sharp] : sharp [Stabbing] : stabbing [Intermittent] : intermittent [Household chores] : household chores [Rest] : rest [Meds] : meds [Walking] : walking [de-identified] : RIGHT TKA 9/6/23 STILL STIFF  11/16/2023 FOLLOW UP S/P RIGHT TKA /JHONNY BENDING BETTER   12/28/23 follow up s/p right tka doing good; better mobility and steps are easier [] : This patient has had an injection before: no [FreeTextEntry1] : knee [de-identified] : sx [de-identified] : 9/6/23

## 2023-12-28 NOTE — PHYSICAL EXAM
[Right] : right knee [] : patient ambulates without assistive device [de-identified] : WAS NOT ASSESSED.  [TWNoteComboBox7] : flexion 95 degrees [de-identified] : extension 0 degrees

## 2024-01-22 ENCOUNTER — APPOINTMENT (OUTPATIENT)
Dept: HEMATOLOGY ONCOLOGY | Facility: CLINIC | Age: 69
End: 2024-01-22

## 2024-01-31 ENCOUNTER — OUTPATIENT (OUTPATIENT)
Dept: OUTPATIENT SERVICES | Facility: HOSPITAL | Age: 69
LOS: 1 days | End: 2024-01-31
Payer: COMMERCIAL

## 2024-01-31 ENCOUNTER — APPOINTMENT (OUTPATIENT)
Dept: HEMATOLOGY ONCOLOGY | Facility: CLINIC | Age: 69
End: 2024-01-31
Payer: COMMERCIAL

## 2024-01-31 VITALS
TEMPERATURE: 98.2 F | RESPIRATION RATE: 19 BRPM | DIASTOLIC BLOOD PRESSURE: 74 MMHG | HEIGHT: 62 IN | OXYGEN SATURATION: 99 % | SYSTOLIC BLOOD PRESSURE: 126 MMHG | WEIGHT: 154 LBS | HEART RATE: 70 BPM | BODY MASS INDEX: 28.34 KG/M2

## 2024-01-31 DIAGNOSIS — Z98.51 TUBAL LIGATION STATUS: Chronic | ICD-10-CM

## 2024-01-31 DIAGNOSIS — D05.11 INTRADUCTAL CARCINOMA IN SITU OF RIGHT BREAST: ICD-10-CM

## 2024-01-31 DIAGNOSIS — Z98.890 OTHER SPECIFIED POSTPROCEDURAL STATES: Chronic | ICD-10-CM

## 2024-01-31 DIAGNOSIS — M25.569 PAIN IN UNSPECIFIED KNEE: ICD-10-CM

## 2024-01-31 DIAGNOSIS — Z51.81 ENCOUNTER FOR THERAPEUTIC DRUG LEVEL MONITORING: ICD-10-CM

## 2024-01-31 DIAGNOSIS — Z51.81 ENCOUNTER FOR THERAPEUTIC DRUG LVL MONITORING: ICD-10-CM

## 2024-01-31 DIAGNOSIS — Z79.811 ENCOUNTER FOR THERAPEUTIC DRUG LVL MONITORING: ICD-10-CM

## 2024-01-31 PROCEDURE — 99213 OFFICE O/P EST LOW 20 MIN: CPT

## 2024-01-31 NOTE — HISTORY OF PRESENT ILLNESS
[de-identified] : 64 yo female is referred by Dr. Jovel for consultation of adjuvant endocrine therapy. She had a screening mammo on 2020 which showed calcifications in the upper outer quadrant of the right breast and an asymmetry seen in the medial of the right breast. On 2020, dx mammo and US right breast confirmed Indeterminate right breast calcifications and asymmetries for which stereotactic guided biopsies were recommended.\par  \par  On 2021, stereotactic guided vacuum assisted needle core biopsies were performed:\par  -  The right breast architectural distortion-medial biopsy revealed benign atrophic fatty breast tissue with nodular fat necrosis and proliferative type fibrocystic changes associated with microcalcifications.\par  - The right breast right calcifications -lateral biopsy showed Ductal carcinoma in-situ (DCIS), solid and comedo types associated with calcifications, high nuclear grade, ER pos 100%, W[place] pos 10%.\par  \par  On 2021, she had b/l breast MRI which showed 1.6 cm clumped nonmass enhancement in the upper outer right breast associated with a biopsy clip at site of biopsy-proven DCIS. Small hematoma associated with a biopsy clip in the upper inner right breast at site of biopsy-proven benign pathology. No associated suspicious enhancement at this biopsy site. No suspicious enhancing findings in the contralateral left breast. No lymphadenopathy.\par  \par  Heather saw Dr. Jovel for surgical consultation. She underwent right breast lumpectomy on 2021. The pathology report revealed single focus of ductal carcinoma in situ, intermediate nuclear grade, solid type. The margins were all negative for DCIS. Lobular carcinoma in situ, classic type-see was seen.  \par  \par  She recovered well from surgery and is here today to discuss adjuvant endocrine therapy.\par  \par  She is , menopause at early 50's. She does not have family h/o breast or ovarian cancer. \par  \par   [de-identified] : 6/10/21 Pt is here today for follow up visit. She has  DCIS  s/p right breast lumpectomy with negative margins on 2/16/21.  She completed adjuvant WBI in April 2021 and started Anastrozole on 5/1/21 which she is tolerating well. She walks 5 miles 5x/week.  Last bone density was done 3/8/21- normal.  She takes calcium and vitamin D.  She is scheduled for her mammogram on 8/2021.  She offers no new breast complaints.  9/27/21: Heather is here today for follow up visit. She has  DCIS  s/p right breast lumpectomy with negative margins on 2/16/21.  She completed adjuvant WBI in April 2021 and started Anastrozole on 5/1/21 which she is tolerating well. She exercises regularly. Last bone density was done 3/8/21 was normal.  She takes calcium and vitamin D.  She had right breast dx mammo in 7/2021. There was no suspicious finding. She complains feeling should aching.  7/27/22 Heather is here today for follow up visit. She has  DCIS  s/p right breast lumpectomy with negative margins on 2/16/21.  She completed adjuvant WBI in April 2021 and started Anastrozole on 5/1/21 which she is tolerating well. She exercises regularly. Last bone density was done 3/8/21 was normal. She had bilateral mammogram and US 1/2022 no evidence of malignancy. She also had bilateral breast MRI 6/2022 No suspicious enhancement to warrant biopsy with expected changes from right lumpectomy.   7/6/23: Heather is here today for follow up visit. She has  DCIS  s/p right breast lumpectomy with negative margins on 2/16/21. She completed adjuvant WBI in April 2021. She has been on adjuvant endocrine therapy with Anastrozole since 5/1/2021. She had bilateral mammogram and US 4/2023. There was no suspicious finding. She has b/l knee pain and is going to have knee replacement.   1/31/24 Heather is here today for follow up visit. She has  DCIS  s/p right breast lumpectomy with negative margins on 2/16/21. She completed adjuvant WBI in April 2021. She has been on adjuvant endocrine therapy with Anastrozole since 5/1/2021. She had bilateral mammogram and US 4/2023. There was no suspicious finding. 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

## 2024-01-31 NOTE — ASSESSMENT
[FreeTextEntry1] : 68 yo female has intermediate nuclear grade ductal carcinoma in situ, ER/AK positive, s/p right breast lumpectomy with negative margins.  Assessment and Plan: -- Continue Anastrozole daily.   -- Breast exam today did not reveal palpable abnormality.  bilateral mammogram and US 4/2023 did not suspicious finding. She will resume annual screening mammo. She has an appt 5/2024. -- S/p right total knee replacement, 9/2023, Followup with orthopedics and PT.  -- She had Bone density 7/13/23 which was normal.  Continue calcium and vitamin D supplement and regular exercise.  -- Followup with Dr. Jovel and Dr. Lomeli as scheduled.  -- Follow up with PCP for other healthcare issues. -- RTO for followup in 6 months. She will call if there is any new concern.

## 2024-01-31 NOTE — PHYSICAL EXAM
[Fully active, able to carry on all pre-disease performance without restriction] : Status 0 - Fully active, able to carry on all pre-disease performance without restriction [Normal] : affect appropriate [de-identified] : Status post right breast lumpectomy. The surgical incision is healing well. There is no palpable abnormality.

## 2024-04-22 NOTE — ASU DISCHARGE PLAN (ADULT/PEDIATRIC) - FOLLOW UP APPOINTMENTS
Denies known Latex allergy or symptoms of Latex sensitivity.  Medications verified, no changes.  Elieser Jaquez MD  No chaperone needed      Rita Reneeth Nikkie is a 49 year old female presenting with   Chief Complaint   Patient presents with    ER F/U     Left ovarian cyst- still having pain            Patient would like communication of their results via:    [employer]       [facility] After 8 pm PACU /[facility], [facility]

## 2024-05-01 ENCOUNTER — APPOINTMENT (OUTPATIENT)
Dept: ULTRASOUND IMAGING | Facility: CLINIC | Age: 69
End: 2024-05-01
Payer: COMMERCIAL

## 2024-05-01 ENCOUNTER — APPOINTMENT (OUTPATIENT)
Dept: MAMMOGRAPHY | Facility: CLINIC | Age: 69
End: 2024-05-01
Payer: COMMERCIAL

## 2024-05-01 ENCOUNTER — OUTPATIENT (OUTPATIENT)
Dept: OUTPATIENT SERVICES | Facility: HOSPITAL | Age: 69
LOS: 1 days | End: 2024-05-01

## 2024-05-01 DIAGNOSIS — Z98.51 TUBAL LIGATION STATUS: Chronic | ICD-10-CM

## 2024-05-01 DIAGNOSIS — Z98.890 OTHER SPECIFIED POSTPROCEDURAL STATES: Chronic | ICD-10-CM

## 2024-05-01 PROCEDURE — 77066 DX MAMMO INCL CAD BI: CPT | Mod: 26

## 2024-05-01 PROCEDURE — G0279: CPT | Mod: 26

## 2024-05-01 PROCEDURE — 76641 ULTRASOUND BREAST COMPLETE: CPT | Mod: 26,50

## 2024-05-03 ENCOUNTER — APPOINTMENT (OUTPATIENT)
Dept: ORTHOPEDIC SURGERY | Facility: CLINIC | Age: 69
End: 2024-05-03
Payer: COMMERCIAL

## 2024-05-03 DIAGNOSIS — M17.0 BILATERAL PRIMARY OSTEOARTHRITIS OF KNEE: ICD-10-CM

## 2024-05-03 DIAGNOSIS — M17.12 UNILATERAL PRIMARY OSTEOARTHRITIS, LEFT KNEE: ICD-10-CM

## 2024-05-03 PROCEDURE — 99213 OFFICE O/P EST LOW 20 MIN: CPT | Mod: 25

## 2024-05-03 PROCEDURE — 20610 DRAIN/INJ JOINT/BURSA W/O US: CPT | Mod: LT

## 2024-05-03 NOTE — HISTORY OF PRESENT ILLNESS
[de-identified] : Patient here for pain to her left knee. Patient is stated at this time the pain being quite significant. Patient states that she had a knee replacement to the right knee doing great with her right knee. Patient stated she is not ready to have the knee replacement on her left knee yet, probably next year.

## 2024-05-03 NOTE — DISCUSSION/SUMMARY
[de-identified] : Impression: Left knee osteoarthritis  Plan: Risk and benefits of cortisone injection were discussed with the patient, after verbal consent knee was prepped with alcohol and Betadine. Cortisone injection to the left knee was given using a sterile technique. Ethyl chloride was used as an anesthetic.   Injection was given using the inferior lateral portal. 10 mg of dexamethasone and 50 mg of 2% lidocaine were given. Patient tolerated injection well. Patient was advised that if any redness, or increased pain to give a call to the office. Patient is aware that on the 3 cortisone injections a year are allowed.  Patient is aware that for the treatment of osteoarthritis to the knee could be cortisone injection and gel injections, patient stated that she did not like the gel injections in the past. At this time she would like to continue taking cortisone injection. At this time I offered a steroid injection, I explained ice extended release corticosteroid and he may be a better choice for the treatment of her osteoarthritic pain.  We did start the authorization process for Zilretta for the left knee  Follow-up: 4 months for reevaluation and possible Zilretta injection.

## 2024-05-03 NOTE — IMAGING
[de-identified] : Examination of the left knee, patient has mild swelling, no ecchymosis, no erythema. Tenderness ovation over the medial lateral joint. Pain with flexion extension of the knee. Some decreased range of motion due to stiffness. No signs for stability. Neurovascular intact.

## 2024-05-10 RX ORDER — ANASTROZOLE TABLETS 1 MG/1
1 TABLET ORAL
Qty: 90 | Refills: 1 | Status: ACTIVE | COMMUNITY
Start: 2021-03-15 | End: 1900-01-01

## 2024-05-16 PROBLEM — M17.12 PRIMARY OSTEOARTHRITIS OF LEFT KNEE: Status: ACTIVE | Noted: 2022-10-05

## 2024-05-16 PROBLEM — M17.0 ARTHRITIS OF BOTH KNEES: Status: ACTIVE | Noted: 2023-05-19

## 2024-06-25 ENCOUNTER — OUTPATIENT (OUTPATIENT)
Dept: OUTPATIENT SERVICES | Facility: HOSPITAL | Age: 69
LOS: 1 days | End: 2024-06-25
Payer: COMMERCIAL

## 2024-06-25 ENCOUNTER — APPOINTMENT (OUTPATIENT)
Dept: OPHTHALMOLOGY | Facility: CLINIC | Age: 69
End: 2024-06-25
Payer: COMMERCIAL

## 2024-06-25 DIAGNOSIS — I10 ESSENTIAL (PRIMARY) HYPERTENSION: ICD-10-CM

## 2024-06-25 DIAGNOSIS — Z98.890 OTHER SPECIFIED POSTPROCEDURAL STATES: Chronic | ICD-10-CM

## 2024-06-25 DIAGNOSIS — H53.8 OTHER VISUAL DISTURBANCES: ICD-10-CM

## 2024-06-25 DIAGNOSIS — H52.4 PRESBYOPIA: ICD-10-CM

## 2024-06-25 DIAGNOSIS — H04.123 DRY EYE SYNDROME OF BILATERAL LACRIMAL GLANDS: ICD-10-CM

## 2024-06-25 PROCEDURE — 92004 COMPRE OPH EXAM NEW PT 1/>: CPT

## 2024-07-01 ENCOUNTER — APPOINTMENT (OUTPATIENT)
Dept: SURGERY | Facility: CLINIC | Age: 69
End: 2024-07-01

## 2024-07-01 PROCEDURE — 99213K: CUSTOM

## 2024-07-29 ENCOUNTER — APPOINTMENT (OUTPATIENT)
Age: 69
End: 2024-07-29
Payer: COMMERCIAL

## 2024-07-29 ENCOUNTER — OUTPATIENT (OUTPATIENT)
Dept: OUTPATIENT SERVICES | Facility: HOSPITAL | Age: 69
LOS: 1 days | End: 2024-07-29
Payer: COMMERCIAL

## 2024-07-29 VITALS
WEIGHT: 155 LBS | HEART RATE: 66 BPM | RESPIRATION RATE: 19 BRPM | SYSTOLIC BLOOD PRESSURE: 121 MMHG | TEMPERATURE: 98 F | DIASTOLIC BLOOD PRESSURE: 66 MMHG | BODY MASS INDEX: 28.52 KG/M2 | HEIGHT: 62 IN | OXYGEN SATURATION: 99 %

## 2024-07-29 DIAGNOSIS — Z51.81 ENCOUNTER FOR THERAPEUTIC DRUG LVL MONITORING: ICD-10-CM

## 2024-07-29 DIAGNOSIS — Z79.811 ENCOUNTER FOR THERAPEUTIC DRUG LVL MONITORING: ICD-10-CM

## 2024-07-29 DIAGNOSIS — D05.11 INTRADUCTAL CARCINOMA IN SITU OF RIGHT BREAST: ICD-10-CM

## 2024-07-29 DIAGNOSIS — Z98.51 TUBAL LIGATION STATUS: Chronic | ICD-10-CM

## 2024-07-29 DIAGNOSIS — Z98.890 OTHER SPECIFIED POSTPROCEDURAL STATES: Chronic | ICD-10-CM

## 2024-07-29 PROCEDURE — 99214 OFFICE O/P EST MOD 30 MIN: CPT

## 2024-07-30 ENCOUNTER — OUTPATIENT (OUTPATIENT)
Dept: OUTPATIENT SERVICES | Facility: HOSPITAL | Age: 69
LOS: 1 days | End: 2024-07-30
Payer: COMMERCIAL

## 2024-07-30 DIAGNOSIS — Z00.8 ENCOUNTER FOR OTHER GENERAL EXAMINATION: ICD-10-CM

## 2024-07-30 DIAGNOSIS — I25.10 ATHEROSCLEROTIC HEART DISEASE OF NATIVE CORONARY ARTERY WITHOUT ANGINA PECTORIS: ICD-10-CM

## 2024-07-30 DIAGNOSIS — Z98.51 TUBAL LIGATION STATUS: Chronic | ICD-10-CM

## 2024-07-30 DIAGNOSIS — D05.11 INTRADUCTAL CARCINOMA IN SITU OF RIGHT BREAST: ICD-10-CM

## 2024-07-30 DIAGNOSIS — Z98.890 OTHER SPECIFIED POSTPROCEDURAL STATES: Chronic | ICD-10-CM

## 2024-07-30 PROCEDURE — 93880 EXTRACRANIAL BILAT STUDY: CPT

## 2024-07-30 PROCEDURE — 93880 EXTRACRANIAL BILAT STUDY: CPT | Mod: 26

## 2024-07-31 DIAGNOSIS — I25.10 ATHEROSCLEROTIC HEART DISEASE OF NATIVE CORONARY ARTERY WITHOUT ANGINA PECTORIS: ICD-10-CM

## 2024-08-04 NOTE — PHYSICAL EXAM
[Fully active, able to carry on all pre-disease performance without restriction] : Status 0 - Fully active, able to carry on all pre-disease performance without restriction [Normal] : affect appropriate [de-identified] : Status post right breast lumpectomy. The surgical incision is healing well. There is no palpable abnormality.

## 2024-08-04 NOTE — CONSULT LETTER
[Dear  ___] : Dear  [unfilled], [Courtesy Letter:] : I had the pleasure of seeing your patient, [unfilled], in my office today. [Please see my note below.] : Please see my note below. [Sincerely,] : Sincerely, [FreeTextEntry3] : Medina Curiel MD [DrDanielle  ___] : Dr. WOODS

## 2024-08-04 NOTE — HISTORY OF PRESENT ILLNESS
[de-identified] : 66 yo female is referred by Dr. Jovel for consultation of adjuvant endocrine therapy. She had a screening mammo on 2020 which showed calcifications in the upper outer quadrant of the right breast and an asymmetry seen in the medial of the right breast. 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 Small hematoma associated with a biopsy clip in the upper inner right breast at site of biopsy-proven benign pathology. No associated suspicious enhancement at this biopsy site. No suspicious enhancing findings in the contralateral left breast. No lymphadenopathy.\par  \par  Heather saw Dr. Jovel for surgical consultation. She underwent right breast lumpectomy on 2021. The pathology report revealed single focus of ductal carcinoma in situ, intermediate nuclear grade, solid type. The margins were all negative for DCIS. Lobular carcinoma in situ, classic type-see was seen.  \par  \par  She recovered well from surgery and is here today to discuss adjuvant endocrine therapy.\par  \par  She is , menopause at early 50's. She does not have family h/o breast or ovarian cancer. \par  \par   [de-identified] : 6/10/21 Pt is here today for follow up visit. She has  DCIS  s/p right breast lumpectomy with negative margins on 2/16/21.  She completed adjuvant WBI in April 2021 and started Anastrozole on 5/1/21 which she is tolerating well. She walks 5 miles 5x/week.  Last bone density was done 3/8/21- normal.  She takes calcium and vitamin D.  She is scheduled for her mammogram on 8/2021.  She offers no new breast complaints.  9/27/21: Heather is here today for follow up visit. She has  DCIS  s/p right breast lumpectomy with negative margins on 2/16/21.  She completed adjuvant WBI in April 2021 and started Anastrozole on 5/1/21 which she is tolerating well. She exercises regularly. Last bone density was done 3/8/21 was normal.  She takes calcium and vitamin D.  She had right breast dx mammo in 7/2021. There was no suspicious finding. She complains feeling should aching.  7/27/22 Heather is here today for follow up visit. She has  DCIS  s/p right breast lumpectomy with negative margins on 2/16/21.  She completed adjuvant WBI in April 2021 and started Anastrozole on 5/1/21 which she is tolerating well. She exercises regularly. Last bone density was done 3/8/21 was normal. She had bilateral mammogram and US 1/2022 no evidence of malignancy. She also had bilateral breast MRI 6/2022 No suspicious enhancement to warrant biopsy with expected changes from right lumpectomy.   7/6/23: Heather is here today for follow up visit. She has  DCIS  s/p right breast lumpectomy with negative margins on 2/16/21. She completed adjuvant WBI in April 2021. She has been on adjuvant endocrine therapy with Anastrozole since 5/1/2021. She had bilateral mammogram and US 4/2023. There was no suspicious finding. She has b/l knee pain and is going to have knee replacement.   1/31/24 Heather is here today for follow up visit. She has  DCIS  s/p right breast lumpectomy with negative margins on 2/16/21. She completed adjuvant WBI in April 2021. She has been on adjuvant endocrine therapy with Anastrozole since 5/1/2021. She had bilateral mammogram and US 4/2023. There was no suspicious finding. 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   7/29/24: Heather is here today for follow up visit. She has  DCIS  s/p right breast lumpectomy with negative margins on 2/16/21. She completed adjuvant WBI in April 2021. She has been on adjuvant endocrine therapy with Anastrozole since 5/1/2021. She had bilateral mammogram and US 4/2023. There was no suspicious finding. She is due for annual mammo.

## 2024-09-06 ENCOUNTER — NON-APPOINTMENT (OUTPATIENT)
Age: 69
End: 2024-09-06

## 2024-09-06 ENCOUNTER — APPOINTMENT (OUTPATIENT)
Dept: ORTHOPEDIC SURGERY | Facility: CLINIC | Age: 69
End: 2024-09-06
Payer: COMMERCIAL

## 2024-09-06 DIAGNOSIS — M17.12 UNILATERAL PRIMARY OSTEOARTHRITIS, LEFT KNEE: ICD-10-CM

## 2024-09-06 PROCEDURE — 20610 DRAIN/INJ JOINT/BURSA W/O US: CPT | Mod: LT

## 2024-09-06 RX ORDER — TRAMADOL HYDROCHLORIDE 50 MG/1
50 TABLET, COATED ORAL
Qty: 14 | Refills: 0 | Status: ACTIVE | COMMUNITY
Start: 2024-09-06 | End: 1900-01-01

## 2024-09-17 ENCOUNTER — APPOINTMENT (OUTPATIENT)
Dept: OPHTHALMOLOGY | Facility: CLINIC | Age: 69
End: 2024-09-17

## 2024-12-06 ENCOUNTER — APPOINTMENT (OUTPATIENT)
Dept: ORTHOPEDIC SURGERY | Facility: CLINIC | Age: 69
End: 2024-12-06
Payer: COMMERCIAL

## 2024-12-06 DIAGNOSIS — M17.12 UNILATERAL PRIMARY OSTEOARTHRITIS, LEFT KNEE: ICD-10-CM

## 2024-12-06 PROCEDURE — 20610 DRAIN/INJ JOINT/BURSA W/O US: CPT | Mod: LT

## 2025-01-20 ENCOUNTER — APPOINTMENT (OUTPATIENT)
Dept: MRI IMAGING | Facility: CLINIC | Age: 70
End: 2025-01-20
Payer: COMMERCIAL

## 2025-01-20 ENCOUNTER — OUTPATIENT (OUTPATIENT)
Dept: OUTPATIENT SERVICES | Facility: HOSPITAL | Age: 70
LOS: 1 days | End: 2025-01-20

## 2025-01-20 DIAGNOSIS — Z98.890 OTHER SPECIFIED POSTPROCEDURAL STATES: Chronic | ICD-10-CM

## 2025-01-20 DIAGNOSIS — Z98.51 TUBAL LIGATION STATUS: Chronic | ICD-10-CM

## 2025-01-20 PROCEDURE — 77049 MRI BREAST C-+ W/CAD BI: CPT | Mod: 26

## 2025-03-07 ENCOUNTER — APPOINTMENT (OUTPATIENT)
Dept: ORTHOPEDIC SURGERY | Facility: CLINIC | Age: 70
End: 2025-03-07

## 2025-03-14 ENCOUNTER — APPOINTMENT (OUTPATIENT)
Dept: ORTHOPEDIC SURGERY | Facility: CLINIC | Age: 70
End: 2025-03-14
Payer: COMMERCIAL

## 2025-03-14 DIAGNOSIS — M17.12 UNILATERAL PRIMARY OSTEOARTHRITIS, LEFT KNEE: ICD-10-CM

## 2025-03-14 PROCEDURE — 20610 DRAIN/INJ JOINT/BURSA W/O US: CPT | Mod: LT

## 2025-03-14 PROCEDURE — 99213 OFFICE O/P EST LOW 20 MIN: CPT | Mod: 25

## 2025-03-14 PROCEDURE — 73562 X-RAY EXAM OF KNEE 3: CPT | Mod: LT

## 2025-03-31 ENCOUNTER — OUTPATIENT (OUTPATIENT)
Dept: OUTPATIENT SERVICES | Facility: HOSPITAL | Age: 70
LOS: 1 days | End: 2025-03-31
Payer: COMMERCIAL

## 2025-03-31 ENCOUNTER — APPOINTMENT (OUTPATIENT)
Age: 70
End: 2025-03-31
Payer: COMMERCIAL

## 2025-03-31 VITALS
HEIGHT: 62 IN | DIASTOLIC BLOOD PRESSURE: 75 MMHG | TEMPERATURE: 97.8 F | HEART RATE: 55 BPM | WEIGHT: 167 LBS | SYSTOLIC BLOOD PRESSURE: 150 MMHG | BODY MASS INDEX: 30.73 KG/M2 | OXYGEN SATURATION: 99 %

## 2025-03-31 DIAGNOSIS — Z98.890 OTHER SPECIFIED POSTPROCEDURAL STATES: Chronic | ICD-10-CM

## 2025-03-31 DIAGNOSIS — D05.11 INTRADUCTAL CARCINOMA IN SITU OF RIGHT BREAST: ICD-10-CM

## 2025-03-31 DIAGNOSIS — Z79.811 ENCOUNTER FOR THERAPEUTIC DRUG LVL MONITORING: ICD-10-CM

## 2025-03-31 DIAGNOSIS — Z51.81 ENCOUNTER FOR THERAPEUTIC DRUG LVL MONITORING: ICD-10-CM

## 2025-03-31 DIAGNOSIS — Z98.51 TUBAL LIGATION STATUS: Chronic | ICD-10-CM

## 2025-03-31 PROCEDURE — 99214 OFFICE O/P EST MOD 30 MIN: CPT

## 2025-04-01 DIAGNOSIS — D05.11 INTRADUCTAL CARCINOMA IN SITU OF RIGHT BREAST: ICD-10-CM

## 2025-06-20 ENCOUNTER — APPOINTMENT (OUTPATIENT)
Dept: ORTHOPEDIC SURGERY | Facility: CLINIC | Age: 70
End: 2025-06-20
Payer: COMMERCIAL

## 2025-06-20 PROCEDURE — 99213 OFFICE O/P EST LOW 20 MIN: CPT

## 2025-07-02 ENCOUNTER — APPOINTMENT (OUTPATIENT)
Dept: ORTHOPEDIC SURGERY | Facility: CLINIC | Age: 70
End: 2025-07-02

## 2025-07-03 ENCOUNTER — APPOINTMENT (OUTPATIENT)
Dept: ORTHOPEDIC SURGERY | Facility: CLINIC | Age: 70
End: 2025-07-03
Payer: COMMERCIAL

## 2025-07-03 PROCEDURE — 20610 DRAIN/INJ JOINT/BURSA W/O US: CPT | Mod: LT

## 2025-07-25 ENCOUNTER — APPOINTMENT (OUTPATIENT)
Dept: MAMMOGRAPHY | Facility: CLINIC | Age: 70
End: 2025-07-25
Payer: COMMERCIAL

## 2025-07-25 ENCOUNTER — OUTPATIENT (OUTPATIENT)
Dept: OUTPATIENT SERVICES | Facility: HOSPITAL | Age: 70
LOS: 1 days | End: 2025-07-25

## 2025-07-25 ENCOUNTER — APPOINTMENT (OUTPATIENT)
Dept: ULTRASOUND IMAGING | Facility: CLINIC | Age: 70
End: 2025-07-25
Payer: COMMERCIAL

## 2025-07-25 DIAGNOSIS — Z98.890 OTHER SPECIFIED POSTPROCEDURAL STATES: Chronic | ICD-10-CM

## 2025-07-25 DIAGNOSIS — Z98.51 TUBAL LIGATION STATUS: Chronic | ICD-10-CM

## 2025-07-25 PROCEDURE — 76641 ULTRASOUND BREAST COMPLETE: CPT | Mod: 26,50

## 2025-07-25 PROCEDURE — G0279: CPT | Mod: 26

## 2025-07-25 PROCEDURE — 77066 DX MAMMO INCL CAD BI: CPT | Mod: 26

## 2025-08-04 ENCOUNTER — APPOINTMENT (OUTPATIENT)
Dept: SURGERY | Facility: CLINIC | Age: 70
End: 2025-08-04
Payer: COMMERCIAL

## 2025-08-04 PROCEDURE — 99213K: CUSTOM

## 2025-08-18 ENCOUNTER — APPOINTMENT (OUTPATIENT)
Dept: CARDIOLOGY | Facility: CLINIC | Age: 70
End: 2025-08-18
Payer: COMMERCIAL

## 2025-08-18 ENCOUNTER — NON-APPOINTMENT (OUTPATIENT)
Age: 70
End: 2025-08-18

## 2025-08-18 VITALS
WEIGHT: 164 LBS | BODY MASS INDEX: 30.18 KG/M2 | HEART RATE: 51 BPM | DIASTOLIC BLOOD PRESSURE: 72 MMHG | SYSTOLIC BLOOD PRESSURE: 118 MMHG | HEIGHT: 62 IN

## 2025-08-18 DIAGNOSIS — E78.2 MIXED HYPERLIPIDEMIA: ICD-10-CM

## 2025-08-18 DIAGNOSIS — Z96.651 PRESENCE OF RIGHT ARTIFICIAL KNEE JOINT: ICD-10-CM

## 2025-08-18 DIAGNOSIS — D05.11 INTRADUCTAL CARCINOMA IN SITU OF RIGHT BREAST: ICD-10-CM

## 2025-08-18 DIAGNOSIS — I10 ESSENTIAL (PRIMARY) HYPERTENSION: ICD-10-CM

## 2025-08-18 PROCEDURE — 93000 ELECTROCARDIOGRAM COMPLETE: CPT

## 2025-08-18 PROCEDURE — 99204 OFFICE O/P NEW MOD 45 MIN: CPT

## 2025-08-18 RX ORDER — ROSUVASTATIN CALCIUM 10 MG/1
10 TABLET, FILM COATED ORAL DAILY
Refills: 0 | Status: ACTIVE | COMMUNITY

## 2025-08-28 ENCOUNTER — NON-APPOINTMENT (OUTPATIENT)
Age: 70
End: 2025-08-28

## (undated) DEVICE — GLV 8 PROTEXIS (WHITE)

## (undated) DEVICE — DRAPE 3/4 SHEET W REINFORCEMENT 56X77"

## (undated) DEVICE — MIXER BONE CEMENT EVAC III

## (undated) DEVICE — SUCTION YANKAUER TAPERED BULBOUS NO VENT

## (undated) DEVICE — POSITIONER FOAM BUMP FLAT TOP 10X6X4" LRG

## (undated) DEVICE — HOOD T5 PEELAWAY

## (undated) DEVICE — MARKING PEN W RULER

## (undated) DEVICE — CRYO/CUFF GRAVITY COOLER KNEE LARGE

## (undated) DEVICE — DRSG PICO NPWT 4X12"

## (undated) DEVICE — MAKO BLADE NARROW

## (undated) DEVICE — SAW BLADE STRYKER SAGITTAL 25X0.89X75MM

## (undated) DEVICE — SAW BLADE STRYKER SAGITTAL PERFORMANCE SERIES 25X1.19X90MM

## (undated) DEVICE — SYR LUER LOK 10CC

## (undated) DEVICE — GLV 8 PROTEXIS (BLUE)

## (undated) DEVICE — MAKO DRAPE KIT

## (undated) DEVICE — SAW BLADE STRYKER SAGITTAL DUAL CUT 18X90X1.19MM

## (undated) DEVICE — DRSG ACE BANDAGE 6"

## (undated) DEVICE — MAKO BLADE STANDARD

## (undated) DEVICE — PREP BETADINE KIT

## (undated) DEVICE — TRAY IRR SYR PISTON

## (undated) DEVICE — SUT PDO 2 1/2 CIRCLE 40MM NDL 45CM

## (undated) DEVICE — GOWN IMPERV BREATHABLE XL

## (undated) DEVICE — SUT VICRYL 1 36" CTX UNDYED

## (undated) DEVICE — DRSG WEBRIL 6"

## (undated) DEVICE — SUT STRATAFIX SPIRAL PDS PLUS 2-0 45CM CT-1

## (undated) DEVICE — SUT VICRYL 2-0 36" CT-1 UNDYED

## (undated) DEVICE — SOL IRR BAG NS 0.9% 3000ML

## (undated) DEVICE — Device

## (undated) DEVICE — DRSG BANDAID 3.75X3"

## (undated) DEVICE — DRSG 4 X 4" 4PLY STERILE

## (undated) DEVICE — DRSG STOCKINETTE TUBULAR COTTON 1PLY 6X72"

## (undated) DEVICE — NDL HYPO SAFE 20G X 1.5" (YELLOW)

## (undated) DEVICE — PACK TOTAL JOINT

## (undated) DEVICE — DRAPE STERI-DRAPE INCISE 19X17"

## (undated) DEVICE — SYR LUER LOK 20CC

## (undated) DEVICE — WARMING BLANKET UPPER ADULT

## (undated) DEVICE — FRA-ESU BOVIE FORCE TRIAD T7J19731DX: Type: DURABLE MEDICAL EQUIPMENT

## (undated) DEVICE — APPLICATOR SKIN PREP CHG 3CC

## (undated) DEVICE — DRAPE STERI-DRAPE INCISE 32X33"

## (undated) DEVICE — ZIMMER PULSAVAC PLUS FAN KIT

## (undated) DEVICE — SUT STRATAFIX SPIRAL MONOCRYL PLUS 4-0 30CM PS-2 UNDYED

## (undated) DEVICE — MAKO VIZADISC KNEE TRACKING KIT